# Patient Record
Sex: FEMALE | Race: WHITE | NOT HISPANIC OR LATINO | Employment: FULL TIME | RURAL
[De-identification: names, ages, dates, MRNs, and addresses within clinical notes are randomized per-mention and may not be internally consistent; named-entity substitution may affect disease eponyms.]

---

## 2023-05-10 ENCOUNTER — OFFICE VISIT (OUTPATIENT)
Dept: FAMILY MEDICINE CLINIC | Facility: CLINIC | Age: 38
End: 2023-05-10
Payer: COMMERCIAL

## 2023-05-10 VITALS
HEART RATE: 84 BPM | OXYGEN SATURATION: 97 % | SYSTOLIC BLOOD PRESSURE: 128 MMHG | DIASTOLIC BLOOD PRESSURE: 80 MMHG | HEIGHT: 64 IN | BODY MASS INDEX: 21.17 KG/M2 | WEIGHT: 124 LBS

## 2023-05-10 DIAGNOSIS — Z13.220 LIPID SCREENING: ICD-10-CM

## 2023-05-10 DIAGNOSIS — N92.6 IRREGULAR MENSES: Primary | ICD-10-CM

## 2023-05-10 LAB
B-HCG UR QL: NEGATIVE
EXPIRATION DATE: ABNORMAL
INTERNAL NEGATIVE CONTROL: ABNORMAL
INTERNAL POSITIVE CONTROL: ABNORMAL
Lab: ABNORMAL

## 2023-05-10 PROCEDURE — 81025 URINE PREGNANCY TEST: CPT | Performed by: PHYSICIAN ASSISTANT

## 2023-05-10 PROCEDURE — 99203 OFFICE O/P NEW LOW 30 MIN: CPT | Performed by: PHYSICIAN ASSISTANT

## 2023-05-10 NOTE — PROGRESS NOTES
"Chief Complaint  Establish Care (Needs a PCP pt states she has been having two periods in a month for couple months )    Subjective          Carina MCCOLLUM presents to [facility]  History of Present Illness  Patient in today to establish care and to discuss irregular periods.  has had 2 periods per months for past 4+ months. Currently on period- states started 2 days ago. These have been associated with cramping as well. States prior to this time they were pretty regular. \[facility] [place] [facility]\"" has not had labs checked in several years and would like to get cholesterol checked while here . \[facility] [place] [facility]\"" last pap smear in 2012 after having tubal ligation. Denies risk for STD. Denies any vaginal discharge or pelvic pain.       Objective   Vital Signs:   /80 (BP Location: Left arm, Patient Position: Sitting, Cuff Size: Large Adult)   Pulse 84   Ht 162.6 cm (64\")   Wt 56.2 kg (124 lb)   SpO2 97%   BMI 21.28 kg/m²     Body mass index is 21.28 kg/m².    Review of Systems   Constitutional: Negative for fever.   Respiratory: Negative for cough and shortness of breath.    Cardiovascular: Negative for chest pain.   Gastrointestinal: Negative for diarrhea, nausea and vomiting.   Genitourinary: Negative for dysuria and frequency.   Neurological: Negative for dizziness and headache.       Past History:  Medical History: has no past medical history on file.   Surgical History: has a past surgical history that includes Essure tubal ligation (Bilateral, 2012).   Family History: family history includes Breast cancer in her mother; COPD in her mother; Lung cancer in her maternal aunt; No Known Problems in her brother and father.   Social History: reports that she has been smoking cigarettes. She started smoking about 23 years ago. She has been smoking an average of .5 packs per day. She does not have any smokeless tobacco history on file. She reports current alcohol use. She reports that she does not use " drugs.    No current outpatient medications on file.  Allergies: Patient has no known allergies.    Physical Exam  Constitutional:       Appearance: Normal appearance.   HENT:      Right Ear: Tympanic membrane normal.      Left Ear: Tympanic membrane normal.      Mouth/Throat:      Mouth: Mucous membranes are moist.   Eyes:      Pupils: Pupils are equal, round, and reactive to light.   Cardiovascular:      Rate and Rhythm: Normal rate and regular rhythm.      Heart sounds: Normal heart sounds.   Pulmonary:      Effort: Pulmonary effort is normal.      Breath sounds: Normal breath sounds.   Abdominal:      Palpations: Abdomen is soft.      Tenderness: There is no abdominal tenderness. There is no guarding or rebound.   Neurological:      Mental Status: She is alert and oriented to person, place, and time.   Psychiatric:         Mood and Affect: Mood normal.         Behavior: Behavior normal.             Assessment and Plan   Diagnoses and all orders for this visit:    1. Irregular menses (Primary)  -     POC Pregnancy, Urine  -     CBC & Differential; Future  -     Comprehensive Metabolic Panel; Future  -     TSH; Future  Urine hcg negative . Will check labs today and will refer to gyn for further evaluation on irregular cycles.   2. Lipid screening  -     Lipid Panel; Future  Will check fasting lipid panel today- encourage healthy diet and exercise.           Follow Up   No follow-ups on file.  Patient was given instructions and counseling regarding her condition or for health maintenance advice. Please see specific information pulled into the AVS if appropriate.     Ambar Ornelas PA-C

## 2023-05-11 DIAGNOSIS — N92.6 IRREGULAR MENSES: Primary | ICD-10-CM

## 2023-05-11 LAB
ALBUMIN SERPL-MCNC: 4.3 G/DL (ref 3.8–4.8)
ALBUMIN/GLOB SERPL: 1.7 {RATIO} (ref 1.2–2.2)
ALP SERPL-CCNC: 86 IU/L (ref 44–121)
ALT SERPL-CCNC: 9 IU/L (ref 0–32)
AST SERPL-CCNC: 11 IU/L (ref 0–40)
BASOPHILS # BLD AUTO: 0.1 X10E3/UL (ref 0–0.2)
BASOPHILS NFR BLD AUTO: 1 %
BILIRUB SERPL-MCNC: 0.2 MG/DL (ref 0–1.2)
BUN SERPL-MCNC: 7 MG/DL (ref 6–20)
BUN/CREAT SERPL: 12 (ref 9–23)
CALCIUM SERPL-MCNC: 9.2 MG/DL (ref 8.7–10.2)
CHLORIDE SERPL-SCNC: 103 MMOL/L (ref 96–106)
CHOLEST SERPL-MCNC: 147 MG/DL (ref 100–199)
CO2 SERPL-SCNC: 25 MMOL/L (ref 20–29)
CREAT SERPL-MCNC: 0.6 MG/DL (ref 0.57–1)
EGFRCR SERPLBLD CKD-EPI 2021: 117 ML/MIN/1.73
EOSINOPHIL # BLD AUTO: 0.1 X10E3/UL (ref 0–0.4)
EOSINOPHIL NFR BLD AUTO: 2 %
ERYTHROCYTE [DISTWIDTH] IN BLOOD BY AUTOMATED COUNT: 17.2 % (ref 11.7–15.4)
GLOBULIN SER CALC-MCNC: 2.5 G/DL (ref 1.5–4.5)
GLUCOSE SERPL-MCNC: 60 MG/DL (ref 70–99)
HCT VFR BLD AUTO: 34.7 % (ref 34–46.6)
HDLC SERPL-MCNC: 27 MG/DL
HGB BLD-MCNC: 10.9 G/DL (ref 11.1–15.9)
IMM GRANULOCYTES # BLD AUTO: 0 X10E3/UL (ref 0–0.1)
IMM GRANULOCYTES NFR BLD AUTO: 0 %
LDLC SERPL CALC-MCNC: 90 MG/DL (ref 0–99)
LYMPHOCYTES # BLD AUTO: 2.3 X10E3/UL (ref 0.7–3.1)
LYMPHOCYTES NFR BLD AUTO: 31 %
MCH RBC QN AUTO: 24.8 PG (ref 26.6–33)
MCHC RBC AUTO-ENTMCNC: 31.4 G/DL (ref 31.5–35.7)
MCV RBC AUTO: 79 FL (ref 79–97)
MONOCYTES # BLD AUTO: 0.4 X10E3/UL (ref 0.1–0.9)
MONOCYTES NFR BLD AUTO: 6 %
NEUTROPHILS # BLD AUTO: 4.5 X10E3/UL (ref 1.4–7)
NEUTROPHILS NFR BLD AUTO: 60 %
PLATELET # BLD AUTO: 430 X10E3/UL (ref 150–450)
POTASSIUM SERPL-SCNC: 4.3 MMOL/L (ref 3.5–5.2)
PROT SERPL-MCNC: 6.8 G/DL (ref 6–8.5)
RBC # BLD AUTO: 4.39 X10E6/UL (ref 3.77–5.28)
SODIUM SERPL-SCNC: 141 MMOL/L (ref 134–144)
TRIGL SERPL-MCNC: 172 MG/DL (ref 0–149)
TSH SERPL DL<=0.005 MIU/L-ACNC: 0.82 UIU/ML (ref 0.45–4.5)
VLDLC SERPL CALC-MCNC: 30 MG/DL (ref 5–40)
WBC # BLD AUTO: 7.5 X10E3/UL (ref 3.4–10.8)

## 2023-08-22 ENCOUNTER — OFFICE VISIT (OUTPATIENT)
Dept: FAMILY MEDICINE CLINIC | Facility: CLINIC | Age: 38
End: 2023-08-22
Payer: COMMERCIAL

## 2023-08-22 VITALS
DIASTOLIC BLOOD PRESSURE: 78 MMHG | SYSTOLIC BLOOD PRESSURE: 124 MMHG | TEMPERATURE: 98.7 F | HEIGHT: 64 IN | HEART RATE: 90 BPM | BODY MASS INDEX: 20.49 KG/M2 | WEIGHT: 120 LBS | OXYGEN SATURATION: 98 %

## 2023-08-22 DIAGNOSIS — R10.11 RUQ PAIN: Primary | ICD-10-CM

## 2023-08-22 LAB
B-HCG UR QL: NEGATIVE
BILIRUB BLD-MCNC: NEGATIVE MG/DL
CLARITY, POC: CLEAR
COLOR UR: YELLOW
EXPIRATION DATE: ABNORMAL
EXPIRATION DATE: NORMAL
GLUCOSE UR STRIP-MCNC: NEGATIVE MG/DL
INTERNAL NEGATIVE CONTROL: NORMAL
INTERNAL POSITIVE CONTROL: NORMAL
KETONES UR QL: NEGATIVE
LEUKOCYTE EST, POC: NEGATIVE
Lab: ABNORMAL
Lab: NORMAL
NITRITE UR-MCNC: NEGATIVE MG/ML
PH UR: 6.5 [PH] (ref 5–8)
PROT UR STRIP-MCNC: NEGATIVE MG/DL
RBC # UR STRIP: ABNORMAL /UL
SP GR UR: 1.01 (ref 1–1.03)
UROBILINOGEN UR QL: NORMAL

## 2023-08-22 PROCEDURE — 81025 URINE PREGNANCY TEST: CPT | Performed by: PHYSICIAN ASSISTANT

## 2023-08-22 PROCEDURE — 81003 URINALYSIS AUTO W/O SCOPE: CPT | Performed by: PHYSICIAN ASSISTANT

## 2023-08-22 PROCEDURE — 99213 OFFICE O/P EST LOW 20 MIN: CPT | Performed by: PHYSICIAN ASSISTANT

## 2023-08-22 RX ORDER — ONDANSETRON 4 MG/1
TABLET, FILM COATED ORAL
Qty: 8 TABLET | Refills: 0 | Status: SHIPPED | OUTPATIENT
Start: 2023-08-22

## 2023-08-23 DIAGNOSIS — R10.11 RUQ PAIN: Primary | ICD-10-CM

## 2023-08-23 LAB
ALBUMIN SERPL-MCNC: 4.6 G/DL (ref 3.9–4.9)
ALBUMIN/GLOB SERPL: 2.1 {RATIO} (ref 1.2–2.2)
ALP SERPL-CCNC: 93 IU/L (ref 44–121)
ALT SERPL-CCNC: 13 IU/L (ref 0–32)
AMYLASE SERPL-CCNC: 64 U/L (ref 31–110)
AST SERPL-CCNC: 13 IU/L (ref 0–40)
BASOPHILS # BLD AUTO: 0.1 X10E3/UL (ref 0–0.2)
BASOPHILS NFR BLD AUTO: 1 %
BILIRUB SERPL-MCNC: 0.4 MG/DL (ref 0–1.2)
BUN SERPL-MCNC: 5 MG/DL (ref 6–20)
BUN/CREAT SERPL: 8 (ref 9–23)
CALCIUM SERPL-MCNC: 9.4 MG/DL (ref 8.7–10.2)
CHLORIDE SERPL-SCNC: 100 MMOL/L (ref 96–106)
CO2 SERPL-SCNC: 22 MMOL/L (ref 20–29)
CREAT SERPL-MCNC: 0.65 MG/DL (ref 0.57–1)
EGFRCR SERPLBLD CKD-EPI 2021: 115 ML/MIN/1.73
EOSINOPHIL # BLD AUTO: 0.1 X10E3/UL (ref 0–0.4)
EOSINOPHIL NFR BLD AUTO: 1 %
ERYTHROCYTE [DISTWIDTH] IN BLOOD BY AUTOMATED COUNT: 15.9 % (ref 11.7–15.4)
GLOBULIN SER CALC-MCNC: 2.2 G/DL (ref 1.5–4.5)
GLUCOSE SERPL-MCNC: 105 MG/DL (ref 70–99)
HCT VFR BLD AUTO: 42.1 % (ref 34–46.6)
HGB BLD-MCNC: 13.4 G/DL (ref 11.1–15.9)
IMM GRANULOCYTES # BLD AUTO: 0 X10E3/UL (ref 0–0.1)
IMM GRANULOCYTES NFR BLD AUTO: 0 %
LIPASE SERPL-CCNC: 42 U/L (ref 14–72)
LYMPHOCYTES # BLD AUTO: 2.4 X10E3/UL (ref 0.7–3.1)
LYMPHOCYTES NFR BLD AUTO: 27 %
MCH RBC QN AUTO: 26.9 PG (ref 26.6–33)
MCHC RBC AUTO-ENTMCNC: 31.8 G/DL (ref 31.5–35.7)
MCV RBC AUTO: 84 FL (ref 79–97)
MONOCYTES # BLD AUTO: 0.5 X10E3/UL (ref 0.1–0.9)
MONOCYTES NFR BLD AUTO: 6 %
NEUTROPHILS # BLD AUTO: 5.8 X10E3/UL (ref 1.4–7)
NEUTROPHILS NFR BLD AUTO: 65 %
PLATELET # BLD AUTO: 392 X10E3/UL (ref 150–450)
POTASSIUM SERPL-SCNC: 4.1 MMOL/L (ref 3.5–5.2)
PROT SERPL-MCNC: 6.8 G/DL (ref 6–8.5)
RBC # BLD AUTO: 4.99 X10E6/UL (ref 3.77–5.28)
SODIUM SERPL-SCNC: 138 MMOL/L (ref 134–144)
WBC # BLD AUTO: 8.8 X10E3/UL (ref 3.4–10.8)

## 2023-08-24 DIAGNOSIS — R31.9 HEMATURIA, UNSPECIFIED TYPE: Primary | ICD-10-CM

## 2023-08-24 LAB
BACTERIA UR CULT: NORMAL
BACTERIA UR CULT: NORMAL

## 2024-07-09 ENCOUNTER — TELEPHONE (OUTPATIENT)
Dept: FAMILY MEDICINE CLINIC | Facility: CLINIC | Age: 39
End: 2024-07-09

## 2024-07-09 NOTE — TELEPHONE ENCOUNTER
Caller: Carina MCCOLLUM    Relationship: Self    Best call back number: [phone]    What medication are you requesting: SOMETHING FOR MENOPAUSE, MOOD SWINGS, GO BACK AND FORTH WITH WANTING SEX, HAVING HEAVY BLEEDING DURING PERIODS    What are your current symptoms: NIGHT SWEATS     How long have you been experiencing symptoms: 6  MONTHS    Have you had these symptoms before:    [] Yes  [x] No    Have you been treated for these symptoms before:   [] Yes  [x] No    If a prescription is needed, what is your preferred pharmacy and phone number:  [facility] [address] [place] [address] [phone] [facility] [phone] FX     Additional notes:

## 2024-07-09 NOTE — TELEPHONE ENCOUNTER
Called and spoke with patient. I gave her the number for [facility]. [phone]

## 2025-07-01 ENCOUNTER — APPOINTMENT (OUTPATIENT)
Dept: CT IMAGING | Facility: HOSPITAL | Age: 40
End: 2025-07-01
Payer: COMMERCIAL

## 2025-07-01 ENCOUNTER — APPOINTMENT (OUTPATIENT)
Dept: GENERAL RADIOLOGY | Facility: HOSPITAL | Age: 40
End: 2025-07-01
Payer: COMMERCIAL

## 2025-07-01 ENCOUNTER — APPOINTMENT (OUTPATIENT)
Dept: MRI IMAGING | Facility: HOSPITAL | Age: 40
End: 2025-07-01
Payer: COMMERCIAL

## 2025-07-01 ENCOUNTER — APPOINTMENT (OUTPATIENT)
Dept: NEUROLOGY | Facility: HOSPITAL | Age: 40
End: 2025-07-01
Payer: COMMERCIAL

## 2025-07-01 ENCOUNTER — HOSPITAL ENCOUNTER (OUTPATIENT)
Facility: HOSPITAL | Age: 40
Setting detail: OBSERVATION
Discharge: HOME | End: 2025-07-02
Attending: EMERGENCY MEDICINE | Admitting: INTERNAL MEDICINE
Payer: COMMERCIAL

## 2025-07-01 ENCOUNTER — TELEPHONE (OUTPATIENT)
Dept: FAMILY MEDICINE CLINIC | Facility: CLINIC | Age: 40
End: 2025-07-01
Payer: COMMERCIAL

## 2025-07-01 DIAGNOSIS — I10 HYPERTENSION, UNSPECIFIED TYPE: ICD-10-CM

## 2025-07-01 DIAGNOSIS — R20.0 LEFT SIDED NUMBNESS: Primary | ICD-10-CM

## 2025-07-01 DIAGNOSIS — R51.9 RECURRENT HEADACHE: ICD-10-CM

## 2025-07-01 PROBLEM — R53.1 LEFT-SIDED WEAKNESS: Status: ACTIVE | Noted: 2025-07-01

## 2025-07-01 LAB
ALBUMIN SERPL-MCNC: 4.5 G/DL (ref 3.5–5.2)
ALBUMIN/GLOB SERPL: 1.6 G/DL
ALP SERPL-CCNC: 87 U/L (ref 39–117)
ALT SERPL W P-5'-P-CCNC: 8 U/L (ref 1–33)
AMPHET+METHAMPHET UR QL: NEGATIVE
AMPHETAMINES UR QL: NEGATIVE
ANION GAP SERPL CALCULATED.3IONS-SCNC: 11.4 MMOL/L (ref 5–15)
APTT PPP: 27.4 SECONDS (ref 22–39)
AST SERPL-CCNC: 20 U/L (ref 1–32)
B-HCG UR QL: NEGATIVE
BARBITURATES UR QL SCN: NEGATIVE
BASOPHILS # BLD AUTO: 0.06 10*3/MM3 (ref 0–0.2)
BASOPHILS NFR BLD AUTO: 0.6 % (ref 0–1.5)
BENZODIAZ UR QL SCN: NEGATIVE
BILIRUB SERPL-MCNC: 0.4 MG/DL (ref 0–1.2)
BUN BLDA-MCNC: <3 MG/DL (ref 8–26)
BUN SERPL-MCNC: 3.8 MG/DL (ref 6–20)
BUN/CREAT SERPL: 6.8 (ref 7–25)
BUPRENORPHINE SERPL-MCNC: NEGATIVE NG/ML
CA-I BLDA-SCNC: 1.19 MMOL/L (ref 1.2–1.32)
CALCIUM SPEC-SCNC: 9 MG/DL (ref 8.6–10.5)
CANNABINOIDS SERPL QL: POSITIVE
CHLORIDE BLDA-SCNC: 104 MMOL/L (ref 98–109)
CHLORIDE SERPL-SCNC: 105 MMOL/L (ref 98–107)
CO2 BLDA-SCNC: 21 MMOL/L (ref 24–29)
CO2 SERPL-SCNC: 22.6 MMOL/L (ref 22–29)
COCAINE UR QL: NEGATIVE
CREAT BLDA-MCNC: 0.5 MG/DL (ref 0.6–1.3)
CREAT SERPL-MCNC: 0.56 MG/DL (ref 0.57–1)
DEPRECATED RDW RBC AUTO: 53.4 FL (ref 37–54)
EGFRCR SERPLBLD CKD-EPI 2021: 117.8 ML/MIN/1.73
EGFRCR SERPLBLD CKD-EPI 2021: 121 ML/MIN/1.73
EOSINOPHIL # BLD AUTO: 0.06 10*3/MM3 (ref 0–0.4)
EOSINOPHIL NFR BLD AUTO: 0.6 % (ref 0.3–6.2)
ERYTHROCYTE [DISTWIDTH] IN BLOOD BY AUTOMATED COUNT: 18.6 % (ref 12.3–15.4)
FENTANYL UR-MCNC: NEGATIVE NG/ML
GEN 5 1HR TROPONIN T REFLEX: 11 NG/L
GLOBULIN UR ELPH-MCNC: 2.9 GM/DL
GLUCOSE BLDC GLUCOMTR-MCNC: 104 MG/DL (ref 70–130)
GLUCOSE BLDC GLUCOMTR-MCNC: 105 MG/DL (ref 70–130)
GLUCOSE BLDC GLUCOMTR-MCNC: 111 MG/DL (ref 70–130)
GLUCOSE SERPL-MCNC: 111 MG/DL (ref 65–99)
HCT VFR BLD AUTO: 39.1 % (ref 34–46.6)
HCT VFR BLDA CALC: 42 % (ref 38–51)
HGB BLD-MCNC: 12.5 G/DL (ref 12–15.9)
HGB BLDA-MCNC: 14.3 G/DL (ref 12–17)
HOLD SPECIMEN: NORMAL
IMM GRANULOCYTES # BLD AUTO: 0.05 10*3/MM3 (ref 0–0.05)
IMM GRANULOCYTES NFR BLD AUTO: 0.5 % (ref 0–0.5)
INR PPP: 0.99 (ref 0.89–1.12)
LYMPHOCYTES # BLD AUTO: 2.25 10*3/MM3 (ref 0.7–3.1)
LYMPHOCYTES NFR BLD AUTO: 22 % (ref 19.6–45.3)
MCH RBC QN AUTO: 25.6 PG (ref 26.6–33)
MCHC RBC AUTO-ENTMCNC: 32 G/DL (ref 31.5–35.7)
MCV RBC AUTO: 80.1 FL (ref 79–97)
METHADONE UR QL SCN: NEGATIVE
MONOCYTES # BLD AUTO: 0.61 10*3/MM3 (ref 0.1–0.9)
MONOCYTES NFR BLD AUTO: 6 % (ref 5–12)
NEUTROPHILS NFR BLD AUTO: 7.18 10*3/MM3 (ref 1.7–7)
NEUTROPHILS NFR BLD AUTO: 70.3 % (ref 42.7–76)
NRBC BLD AUTO-RTO: 0 /100 WBC (ref 0–0.2)
NT-PROBNP SERPL-MCNC: 85.8 PG/ML (ref 0–450)
OPIATES UR QL: NEGATIVE
OXYCODONE UR QL SCN: NEGATIVE
PCP UR QL SCN: NEGATIVE
PLATELET # BLD AUTO: 397 10*3/MM3 (ref 140–450)
PMV BLD AUTO: 9.4 FL (ref 6–12)
POTASSIUM BLDA-SCNC: 3.6 MMOL/L (ref 3.5–4.9)
POTASSIUM SERPL-SCNC: 3.7 MMOL/L (ref 3.5–5.2)
PROT SERPL-MCNC: 7.4 G/DL (ref 6–8.5)
PROTHROMBIN TIME: 13.6 SECONDS (ref 12.2–15.3)
QT INTERVAL: 380 MS
QTC INTERVAL: 376 MS
RBC # BLD AUTO: 4.88 10*6/MM3 (ref 3.77–5.28)
SODIUM BLD-SCNC: 140 MMOL/L (ref 138–146)
SODIUM SERPL-SCNC: 139 MMOL/L (ref 136–145)
TRICYCLICS UR QL SCN: NEGATIVE
TROPONIN T NUMERIC DELTA: NORMAL
TROPONIN T SERPL HS-MCNC: <6 NG/L
WBC NRBC COR # BLD AUTO: 10.21 10*3/MM3 (ref 3.4–10.8)
WHOLE BLOOD HOLD COAG: NORMAL
WHOLE BLOOD HOLD SPECIMEN: NORMAL

## 2025-07-01 PROCEDURE — 95816 EEG AWAKE AND DROWSY: CPT

## 2025-07-01 PROCEDURE — G0378 HOSPITAL OBSERVATION PER HR: HCPCS

## 2025-07-01 PROCEDURE — 84484 ASSAY OF TROPONIN QUANT: CPT | Performed by: EMERGENCY MEDICINE

## 2025-07-01 PROCEDURE — 85610 PROTHROMBIN TIME: CPT | Performed by: EMERGENCY MEDICINE

## 2025-07-01 PROCEDURE — 81025 URINE PREGNANCY TEST: CPT | Performed by: EMERGENCY MEDICINE

## 2025-07-01 PROCEDURE — 85014 HEMATOCRIT: CPT | Performed by: EMERGENCY MEDICINE

## 2025-07-01 PROCEDURE — 80053 COMPREHEN METABOLIC PANEL: CPT | Performed by: EMERGENCY MEDICINE

## 2025-07-01 PROCEDURE — 82948 REAGENT STRIP/BLOOD GLUCOSE: CPT

## 2025-07-01 PROCEDURE — 70551 MRI BRAIN STEM W/O DYE: CPT

## 2025-07-01 PROCEDURE — 96375 TX/PRO/DX INJ NEW DRUG ADDON: CPT

## 2025-07-01 PROCEDURE — 25810000003 SODIUM CHLORIDE 0.9 % SOLUTION

## 2025-07-01 PROCEDURE — 80307 DRUG TEST PRSMV CHEM ANLYZR: CPT | Performed by: EMERGENCY MEDICINE

## 2025-07-01 PROCEDURE — 99222 1ST HOSP IP/OBS MODERATE 55: CPT | Performed by: INTERNAL MEDICINE

## 2025-07-01 PROCEDURE — 25010000002 METOCLOPRAMIDE PER 10 MG: Performed by: INTERNAL MEDICINE

## 2025-07-01 PROCEDURE — 99285 EMERGENCY DEPT VISIT HI MDM: CPT

## 2025-07-01 PROCEDURE — 80047 BASIC METABLC PNL IONIZED CA: CPT | Performed by: EMERGENCY MEDICINE

## 2025-07-01 PROCEDURE — 70450 CT HEAD/BRAIN W/O DYE: CPT

## 2025-07-01 PROCEDURE — 83880 ASSAY OF NATRIURETIC PEPTIDE: CPT | Performed by: EMERGENCY MEDICINE

## 2025-07-01 PROCEDURE — 36415 COLL VENOUS BLD VENIPUNCTURE: CPT

## 2025-07-01 PROCEDURE — 85025 COMPLETE CBC W/AUTO DIFF WBC: CPT | Performed by: EMERGENCY MEDICINE

## 2025-07-01 PROCEDURE — 0042T HC CT CEREBRAL PERFUSION W/WO CONTRAST: CPT

## 2025-07-01 PROCEDURE — 95816 EEG AWAKE AND DROWSY: CPT | Performed by: PSYCHIATRY & NEUROLOGY

## 2025-07-01 PROCEDURE — 70496 CT ANGIOGRAPHY HEAD: CPT

## 2025-07-01 PROCEDURE — 25510000001 IOPAMIDOL PER 1 ML: Performed by: EMERGENCY MEDICINE

## 2025-07-01 PROCEDURE — 85730 THROMBOPLASTIN TIME PARTIAL: CPT | Performed by: EMERGENCY MEDICINE

## 2025-07-01 PROCEDURE — 25010000002 MAGNESIUM SULFATE IN D5W 1G/100ML (PREMIX) 1-5 GM/100ML-% SOLUTION

## 2025-07-01 PROCEDURE — 25010000002 DIPHENHYDRAMINE PER 50 MG

## 2025-07-01 PROCEDURE — 96366 THER/PROPH/DIAG IV INF ADDON: CPT

## 2025-07-01 PROCEDURE — 71045 X-RAY EXAM CHEST 1 VIEW: CPT

## 2025-07-01 PROCEDURE — 96376 TX/PRO/DX INJ SAME DRUG ADON: CPT

## 2025-07-01 PROCEDURE — 25010000002 METOCLOPRAMIDE PER 10 MG

## 2025-07-01 PROCEDURE — 93005 ELECTROCARDIOGRAM TRACING: CPT | Performed by: EMERGENCY MEDICINE

## 2025-07-01 PROCEDURE — 70498 CT ANGIOGRAPHY NECK: CPT

## 2025-07-01 PROCEDURE — 96365 THER/PROPH/DIAG IV INF INIT: CPT

## 2025-07-01 RX ORDER — ACETAMINOPHEN 325 MG/1
650 TABLET ORAL EVERY 4 HOURS PRN
Status: DISCONTINUED | OUTPATIENT
Start: 2025-07-01 | End: 2025-07-02 | Stop reason: HOSPADM

## 2025-07-01 RX ORDER — ATORVASTATIN CALCIUM 40 MG/1
80 TABLET, FILM COATED ORAL NIGHTLY
Status: DISCONTINUED | OUTPATIENT
Start: 2025-07-01 | End: 2025-07-02 | Stop reason: HOSPADM

## 2025-07-01 RX ORDER — SODIUM CHLORIDE 0.9 % (FLUSH) 0.9 %
10 SYRINGE (ML) INJECTION AS NEEDED
Status: DISCONTINUED | OUTPATIENT
Start: 2025-07-01 | End: 2025-07-02 | Stop reason: HOSPADM

## 2025-07-01 RX ORDER — ACETAMINOPHEN 650 MG/1
650 SUPPOSITORY RECTAL EVERY 4 HOURS PRN
Status: DISCONTINUED | OUTPATIENT
Start: 2025-07-01 | End: 2025-07-02 | Stop reason: HOSPADM

## 2025-07-01 RX ORDER — SODIUM CHLORIDE 0.9 % (FLUSH) 0.9 %
10 SYRINGE (ML) INJECTION EVERY 12 HOURS SCHEDULED
Status: DISCONTINUED | OUTPATIENT
Start: 2025-07-01 | End: 2025-07-02 | Stop reason: HOSPADM

## 2025-07-01 RX ORDER — POTASSIUM CHLORIDE 1500 MG/1
40 TABLET, EXTENDED RELEASE ORAL EVERY 4 HOURS
Status: COMPLETED | OUTPATIENT
Start: 2025-07-01 | End: 2025-07-01

## 2025-07-01 RX ORDER — BISACODYL 5 MG/1
5 TABLET, DELAYED RELEASE ORAL DAILY PRN
Status: DISCONTINUED | OUTPATIENT
Start: 2025-07-01 | End: 2025-07-02 | Stop reason: HOSPADM

## 2025-07-01 RX ORDER — NICOTINE 21 MG/24HR
1 PATCH, TRANSDERMAL 24 HOURS TRANSDERMAL EVERY 24 HOURS
Status: DISCONTINUED | OUTPATIENT
Start: 2025-07-01 | End: 2025-07-02 | Stop reason: HOSPADM

## 2025-07-01 RX ORDER — ASPIRIN 300 MG/1
300 SUPPOSITORY RECTAL DAILY
Status: DISCONTINUED | OUTPATIENT
Start: 2025-07-02 | End: 2025-07-02 | Stop reason: HOSPADM

## 2025-07-01 RX ORDER — CLOPIDOGREL BISULFATE 75 MG/1
300 TABLET ORAL ONCE
Status: COMPLETED | OUTPATIENT
Start: 2025-07-01 | End: 2025-07-01

## 2025-07-01 RX ORDER — SODIUM CHLORIDE 9 MG/ML
40 INJECTION, SOLUTION INTRAVENOUS AS NEEDED
Status: DISCONTINUED | OUTPATIENT
Start: 2025-07-01 | End: 2025-07-02 | Stop reason: HOSPADM

## 2025-07-01 RX ORDER — ONDANSETRON 2 MG/ML
4 INJECTION INTRAMUSCULAR; INTRAVENOUS EVERY 6 HOURS PRN
Status: DISCONTINUED | OUTPATIENT
Start: 2025-07-01 | End: 2025-07-02 | Stop reason: HOSPADM

## 2025-07-01 RX ORDER — ACETAMINOPHEN 160 MG/5ML
650 SOLUTION ORAL EVERY 4 HOURS PRN
Status: DISCONTINUED | OUTPATIENT
Start: 2025-07-01 | End: 2025-07-02 | Stop reason: HOSPADM

## 2025-07-01 RX ORDER — CLOPIDOGREL BISULFATE 75 MG/1
75 TABLET ORAL DAILY
Status: DISCONTINUED | OUTPATIENT
Start: 2025-07-02 | End: 2025-07-02 | Stop reason: HOSPADM

## 2025-07-01 RX ORDER — ASPIRIN 81 MG/1
81 TABLET, CHEWABLE ORAL DAILY
Status: DISCONTINUED | OUTPATIENT
Start: 2025-07-02 | End: 2025-07-02 | Stop reason: HOSPADM

## 2025-07-01 RX ORDER — IOPAMIDOL 755 MG/ML
100 INJECTION, SOLUTION INTRAVASCULAR
Status: COMPLETED | OUTPATIENT
Start: 2025-07-01 | End: 2025-07-01

## 2025-07-01 RX ORDER — AMOXICILLIN 250 MG
2 CAPSULE ORAL 2 TIMES DAILY PRN
Status: DISCONTINUED | OUTPATIENT
Start: 2025-07-01 | End: 2025-07-02 | Stop reason: HOSPADM

## 2025-07-01 RX ORDER — MAGNESIUM SULFATE 1 G/100ML
1 INJECTION INTRAVENOUS ONCE
Status: COMPLETED | OUTPATIENT
Start: 2025-07-01 | End: 2025-07-01

## 2025-07-01 RX ORDER — METOCLOPRAMIDE HYDROCHLORIDE 5 MG/ML
10 INJECTION INTRAMUSCULAR; INTRAVENOUS EVERY 6 HOURS SCHEDULED
Status: DISCONTINUED | OUTPATIENT
Start: 2025-07-01 | End: 2025-07-02 | Stop reason: HOSPADM

## 2025-07-01 RX ORDER — BISACODYL 10 MG
10 SUPPOSITORY, RECTAL RECTAL DAILY PRN
Status: DISCONTINUED | OUTPATIENT
Start: 2025-07-01 | End: 2025-07-02 | Stop reason: HOSPADM

## 2025-07-01 RX ORDER — POLYETHYLENE GLYCOL 3350 17 G/17G
17 POWDER, FOR SOLUTION ORAL DAILY PRN
Status: DISCONTINUED | OUTPATIENT
Start: 2025-07-01 | End: 2025-07-02 | Stop reason: HOSPADM

## 2025-07-01 RX ORDER — ASPIRIN 81 MG/1
324 TABLET, CHEWABLE ORAL ONCE
Status: COMPLETED | OUTPATIENT
Start: 2025-07-01 | End: 2025-07-01

## 2025-07-01 RX ORDER — DIPHENHYDRAMINE HYDROCHLORIDE 50 MG/ML
25 INJECTION, SOLUTION INTRAMUSCULAR; INTRAVENOUS ONCE
Status: COMPLETED | OUTPATIENT
Start: 2025-07-01 | End: 2025-07-01

## 2025-07-01 RX ADMIN — Medication 10 ML: at 20:08

## 2025-07-01 RX ADMIN — POTASSIUM CHLORIDE 40 MEQ: 1500 TABLET, EXTENDED RELEASE ORAL at 18:27

## 2025-07-01 RX ADMIN — Medication 10 ML: at 20:09

## 2025-07-01 RX ADMIN — METOCLOPRAMIDE 10 MG: 5 INJECTION, SOLUTION INTRAMUSCULAR; INTRAVENOUS at 20:08

## 2025-07-01 RX ADMIN — CLOPIDOGREL BISULFATE 300 MG: 75 TABLET, FILM COATED ORAL at 14:36

## 2025-07-01 RX ADMIN — METOCLOPRAMIDE 10 MG: 5 INJECTION, SOLUTION INTRAMUSCULAR; INTRAVENOUS at 14:36

## 2025-07-01 RX ADMIN — METOCLOPRAMIDE 10 MG: 5 INJECTION, SOLUTION INTRAMUSCULAR; INTRAVENOUS at 23:26

## 2025-07-01 RX ADMIN — SODIUM CHLORIDE 1000 ML: 9 INJECTION, SOLUTION INTRAVENOUS at 14:37

## 2025-07-01 RX ADMIN — MAGNESIUM SULFATE IN DEXTROSE 1 G: 10 INJECTION, SOLUTION INTRAVENOUS at 14:37

## 2025-07-01 RX ADMIN — ATORVASTATIN CALCIUM 80 MG: 40 TABLET, FILM COATED ORAL at 20:08

## 2025-07-01 RX ADMIN — POTASSIUM CHLORIDE 40 MEQ: 1500 TABLET, EXTENDED RELEASE ORAL at 23:26

## 2025-07-01 RX ADMIN — ASPIRIN 324 MG: 81 TABLET, CHEWABLE ORAL at 14:36

## 2025-07-01 RX ADMIN — IOPAMIDOL 125 ML: 755 INJECTION, SOLUTION INTRAVENOUS at 13:19

## 2025-07-01 RX ADMIN — NICOTINE TRANSDERMAL SYSTEM 1 PATCH: 21 PATCH, EXTENDED RELEASE TRANSDERMAL at 18:28

## 2025-07-01 RX ADMIN — ACETAMINOPHEN 650 MG: 325 TABLET ORAL at 20:08

## 2025-07-01 RX ADMIN — DIPHENHYDRAMINE HYDROCHLORIDE 25 MG: 50 INJECTION INTRAMUSCULAR; INTRAVENOUS at 14:36

## 2025-07-01 NOTE — Clinical Note
Level of Care: Telemetry [5]   Diagnosis: Left-sided weakness [163961]   Bed Request Comments: cva r/o

## 2025-07-01 NOTE — H&P
"    [facility] Services  HISTORY AND PHYSICAL    Patient Name: Carina MCCOLLUM  : 1985  MRN: [id number]  Primary Care Physician: Ambar Ornelas PA-C  Date of admission: 2025      Subjective   Subjective     Chief Complaint:  Left sided weakness, numbness    HPI:  Carina MCCOLLUM is a 40 y.o. female with PMHx significant for tobacco abuse, migraines who presented to [facility] as a code stroke for left sided weakness/numbness. Patient reports her symptoms started this a couple days ago and have been waxing and waning. Symptoms include left arm/leg weakness, numbness and tremors. She has no known PMHX of stroke or seizures. She reportedly has been witnessed to have \"epidsodes\" that involve rythmic jerking of both her upper extremities and sometimes her legs. She reports they just \"give out\". She previously has been evaluated at [facility] ED for these episodes and was diagnosed with syncope. She denies any other focal deficits. She was quite hypertensive in the ED, however takes no medications for blood pressure and says she was not aware of HTN. In the ER her MRI was negative for stroke, however CTA concerning for possible carotid dissection.  She will be brought in for further work-up and neurology evaluation.    Personal History     History reviewed. No pertinent past medical history.        Past Surgical History:   Procedure Laterality Date    ESSURE TUBAL LIGATION Bilateral        Family History: family history includes Breast cancer in her mother; COPD in her mother; Lung cancer in her maternal aunt; No Known Problems in her brother and father.     Social History:  reports that she has been smoking cigarettes. She started smoking about 25 years ago. She has a 12.7 pack-year smoking history. She does not have any smokeless tobacco history on file. She reports current alcohol use. She reports that she does not use drugs.  Social History     Social History Narrative    Not " on file       Medications:  Available home medication information reviewed.  ondansetron    No Known Allergies    Objective   Objective     Vital Signs:   Temp:  [98.9 °F (37.2 °C)] 98.9 °F (37.2 °C)  Heart Rate:  [65-82] 81  Resp:  [18] 18  BP: (163-196)/() 191/124  Total (NIH Stroke Scale): 0    Physical Exam   Constitutional: Awake, alert  Eyes: PERRLA, sclerae anicteric, no conjunctival injection  HENT: NCAT, mucous membranes moist  Neck: Supple, no thyromegaly, no lymphadenopathy, trachea midline  Respiratory: Clear to auscultation bilaterally, nonlabored respirations   Cardiovascular: RRR, no murmurs, rubs, or gallops, palpable pedal pulses bilaterally  Gastrointestinal: Positive bowel sounds, soft, nontender, nondistended  Musculoskeletal: No bilateral ankle edema, no clubbing or cyanosis to extremities  Psychiatric: Appropriate affect, cooperative  Neurologic: Oriented x 3, strength symmetric in all extremities, Cranial Nerves grossly intact to confrontation, speech clear  Skin: No rashes      Result Review:  I have personally reviewed the results from the time of this admission to 7/1/2025 14:42 EDT and agree with these findings:  [x]  Laboratory list / accordion  [x]  Microbiology  [x]  Radiology  [x]  EKG/Telemetry   [x]  Cardiology/Vascular   []  Pathology  []  Old records  []  Other:  Most notable findings include:       LAB RESULTS:      Lab 07/01/25  1310 07/01/25  1309   WBC  --  10.21   HEMOGLOBIN  --  12.5   HEMOGLOBIN, POC 14.3  --    HEMATOCRIT  --  39.1   HEMATOCRIT POC 42  --    PLATELETS  --  397   NEUTROS ABS  --  7.18*   IMMATURE GRANS (ABS)  --  0.05   LYMPHS ABS  --  2.25   MONOS ABS  --  0.61   EOS ABS  --  0.06   MCV  --  80.1   PROTIME  --  13.6   INR  --  0.99   APTT  --  27.4         Lab 07/01/25  1310 07/01/25  1309   SODIUM  --  139   POTASSIUM  --  3.7   CHLORIDE  --  105   CO2  --  22.6   ANION GAP  --  11.4   BUN  --  3.8*   CREATININE 0.50* 0.56*   EGFR 121.0 117.8    GLUCOSE  --  111*   CALCIUM  --  9.0         Lab 07/01/25  1309   TOTAL PROTEIN 7.4   ALBUMIN 4.5   GLOBULIN 2.9   ALT (SGPT) 8   AST (SGOT) 20   BILIRUBIN 0.4   ALK PHOS 87         Lab 07/01/25  1309   PROBNP 85.8   HSTROP T <6                     Microbiology Results (last 10 days)       ** No results found for the last 240 hours. **            MRI Brain Without Contrast  Result Date: 7/1/2025  MRI BRAIN WO CONTRAST Date of Exam: 7/1/2025 1:48 PM EDT Indication: left sided weakness/ numbness.  Comparison: CT head from earlier today Technique:  Routine multiplanar/multisequence sequence images of the brain were obtained without contrast administration. Findings: No acute infarction, intracranial hemorrhage, or extra-axial collection is identified. The ventricles appear normal in caliber, with no evidence of mass effect or midline shift. The basal cisterns appear patent. The midline structures appear intact. The globes and orbits appear intact. The intracranial vascular flow-voids appear patent. A couple subcortical white matter FLAIR hyperintensities in the right frontal lobe are nonspecific. Both hippocampi appear within normal limits. There is mucosal disease in the right maxillary sinus.     Impression: Impression: 1.No acute intracranial process identified. 2.Mucosal disease within right maxillary sinus. Electronically Signed: Sanjay Childs MD  7/1/2025 2:21 PM EDT  Workstation ID: [id number]    CT Angiogram Head w AI Analysis of LVO  Result Date: 7/1/2025  CT ANGIOGRAM HEAD W AI ANALYSIS OF LVO Date of Exam: 7/1/2025 1:05 PM EDT Indication: Neuro Deficit, acute, Stroke suspected Neuro deficit, acute stroke suspected. Comparison: Noncontrast head CT and CT perfusion from today Technique: CTA of the head was performed after the uneventful intravenous administration of 125 mL Isovue-370. Reconstructed coronal and sagittal images were also obtained. In addition, a 3-D volume rendered image was created for  interpretation. Automated exposure control and iterative reconstruction methods were used. FINDINGS: The visualized intracranial portions of the internal carotid arteries as well as the middle cerebral, anterior cerebral, posterior cerebral, basilar, and vertebral arteries appear patent without significant stenosis. No intracranial aneurysm is seen. No suspicious contrast enhancement is identified. Please refer to the accompanying head CT for description of nonvascular intracranial findings.     Impression: No intracranial large vessel occlusion is identified. Electronically Signed: Yair Aldana MD  7/1/2025 1:58 PM EDT  Workstation ID: [id number]    CT Angiogram Neck  Result Date: 7/1/2025  CT ANGIOGRAM NECK Date of Exam: 7/1/2025 1:05 PM EDT Indication: Neuro Deficit, acute, Stroke suspected Neuro deficit, acute stroke suspected. Comparison: None available. Technique: CTA of the neck was performed before and after the uneventful intravenous administration of 80 mL Isovue-370. Reconstructed coronal and sagittal images were also obtained. In addition, a 3-D volume rendered image was created for interpretation. Automated exposure control and iterative reconstruction methods were used. Findings: The lung apices are clear. The neck soft tissues demonstrate no pathologic cervical lymphadenopathy or aerodigestive tract mass. The osseous structures demonstrate no evidence of acute fracture or aggressive osseous lesion. Patent aortic arch with three-vessel branching. The subclavian arteries are patent bilaterally. There is 0% narrowing of the right and left ICA origins by NASCET criteria. There is a tiny focal linear luminal abnormality present at the left ICA origin, along the posterior wall,  favoring incidental carotid web but at least somewhat concerning for possible small dissection without evidence of associated luminal narrowing or thrombus formation. The vertebral arteries are normal in course and caliber  bilaterally in the neck.     Impression: Impression: CT angiogram of the neck demonstrates no evidence of high-grade stenosis, large vessel occlusion or aneurysm. Small focal linear luminal abnormality seen along the posterior wall of the left ICA origin, favoring a carotid web, with short segment dissection an additional consideration. There is no evidence of associated thrombus formation or luminal narrowing. Electronically Signed: Dajuan Robles MD  7/1/2025 1:37 PM EDT  Workstation ID: [id number]    CT CEREBRAL PERFUSION WITH & WITHOUT CONTRAST  Result Date: 7/1/2025  CT CEREBRAL PERFUSION W WO CONTRAST Date of Exam: 7/1/2025 1:05 PM EDT Indication: Neuro Deficit, acute, Stroke suspected Neuro deficit, acute stroke suspected.  Comparison: Same day CT head Technique: Axial CT images of the brain were obtained prior to and after the administration of 125 mL Isovue-370. Core blood volume, core blood flow, mean transit time, and Tmax images were obtained utilizing the Rapid software protocol. A limited CT angiogram of the head was also performed to measure the blood vessel density. The radiation dose reduction device was turned on for each scan per the ALARA (As Low as Reasonably Achievable) protocol. Findings: There appears to be right greater than left anterior circulation diminished CBF suggestive of infarct (volume 41 mL). There is a large area of right greater than left elevated Tmax (volume 81 mL). The mismatch volume is 40 mL with mismatch ratio of 2. When compared to same day CTA, this does not appear to correlate findings on preliminary review of the CTA and these findings may be secondary to suboptimal bolus timing..     Impression: Impression: 1.Right greater than left anterior circulation diminished CBF suggestive of infarct. 2.There is a large area of right greater than left elevated Tmax (volume 81 mL) with mismatch volume 40 mL. 3.When compared to same day CTA, this does not appear to correlate to  findings on preliminary review of the CTA and these findings may be secondary to suboptimal bolus timing. Findings discussed with stroke team by Dr. Kenneth Anne via telephone on 7/1/2025 1:32 PM EDT. Electronically Signed: Kenneth Anne MD  7/1/2025 1:32 PM EDT  Workstation ID: [id number]    CT Head Without Contrast Stroke Protocol  Result Date: 7/1/2025  CT HEAD WO CONTRAST STROKE PROTOCOL Date of Exam: 7/1/2025 12:58 PM EDT Indication: Neuro deficit, acute, stroke suspected Neuro Deficit, acute, Stroke suspected. Comparison: None available. Technique: Axial CT images were obtained of the head without contrast administration.  Reconstructed coronal images were also obtained. Automated exposure control and iterative construction methods were used. Scan Time: 12:59 p.m. Results discussed with stroke team at 1:09 p.m. Findings: Parenchyma:No acute intraparenchymal hemorrhage. No loss of gray-white differentiation to suggest large territory infarct. Normal parenchymal volume. No substantial white matter disease. No midline shift or herniation. Ventricles and extra axial spaces:Normal caliber of ventricles and sulci. No extra axial fluid collection seen. Other:Orbits are grossly intact. Paranasal sinuses are clear. Mastoid air cells are clear. Calvarium is intact. No substantial intracranial atherosclerotic calcification.     Impression: Impression: No evidence of acute intracranial hemorrhage or large territory infarct. Electronically Signed: Kenneth Anne MD  7/1/2025 1:10 PM EDT  Workstation ID: [id number]          Assessment & Plan   Assessment & Plan       Left-sided weakness    Essential hypertension      Carina MCCOLLUM is a 40 y.o. female with PMHx significant for tobacco abuse, migraines who presented to [facility] as a code stroke for left sided weakness/numbness.     Left Sided Weakness  - stroke vs. TIA vs. Possible seizure vs. HTN encephalopathy, Stroke Neurology following  - MRI brain is negative for  acute process, CTA head/neck with small focal linear luminal abnormality seen along the posterior wall of the left ICA origin, favoring a carotid web vs.short segment dissection   - doubt seizure as she reports being alert/awake during her episodes, will get EEG   - loaded with plavix, continue DAPT and statin  - check A1c, lipids  - Echocardiogram w/bubble  - PT/OT/SLP  - Stroke neurology following, further stroke work-up per stroke team    HTN - uncontrolled  - control BP when okay with neurology, will likely need cardene     Tobacco Abuse:  - nicotine patch    VTE Prophylaxis:  Mechanical VTE prophylaxis orders are signed & held.          CODE STATUS:    Code Status and Medical Interventions: CPR (Attempt to Resuscitate); Full Support   Ordered at: 07/01/25 1441     Code Status (Patient has no pulse and is not breathing):    CPR (Attempt to Resuscitate)     Medical Interventions (Patient has pulse or is breathing):    Full Support     Level Of Support Discussed With:    Patient       Expected Discharge   Expected discharge date/ time has not been documented.     Odalys Viera,   07/01/25

## 2025-07-01 NOTE — ED PROVIDER NOTES
[facility]    EMERGENCY DEPARTMENT ENCOUNTER      Pt Name: Carina MCCOLLUM  MRN: [id number]  YOB: 1985  Date of evaluation: 7/1/2025  Provider: Mayco Vinson DO    CHIEF COMPLAINT       Chief Complaint   Patient presents with    Tremors    Extremity Pain    Hypertension         HISTORY OF PRESENT ILLNESS  (Location/Symptom, Timing/Onset, Context/Setting, Quality, Duration, Modifying Factors, Severity.)   Carina MCCOLLUM is a 40 y.o. female who presents to the emergency department for evaluation with her significant other secondary to her concern for her episode of left sided numbness, tingling, weakness which she notes started sometime Sunday about 2 days ago.  She has had some intermittent headaches which wax and wane in severity.  Has a remote history of migraine, headaches, but she notes she feels that her left leg had some numbness and tingling earlier this morning around 630, then the numbness went away then she notes some pain around the left hip region.  She notes she felt this a little off in the left side in her arm and leg.  She notes possible syncopal episodes, possible seizure-like activity.  She states she has not had any prior diagnosis of seizure activity, no history of known stroke or workup.  She had a outside facility evaluation at [facility] and was diagnosed with low potassium levels and syncope.  She has not had any prior neuroimaging or other advanced workup.  She states she does have a history of headaches and migraines, but nothing to this extent.  Her family notes during his episodes she does have these tremor-like activities with rhythmic jerking in her bilateral upper extremities which resolves when she comes to.  She denies any recent illness, no fever, chills, denies any chest pain, no palpitations or history of underlying arrhythmia.  She denies any other acute systemic complaints at this time.      Nursing notes were reviewed.      PAST MEDICAL HISTORY    History reviewed. No pertinent past medical history.      SURGICAL HISTORY       Past Surgical History:   Procedure Laterality Date    ESSURE TUBAL LIGATION Bilateral 2012         CURRENT MEDICATIONS       Current Facility-Administered Medications:     aspirin chewable tablet 324 mg, 324 mg, Oral, Once, Sangita Briceno, APRN    clopidogrel (PLAVIX) tablet 300 mg, 300 mg, Oral, Once, Kaity Sangita C, APRN    diphenhydrAMINE (BENADRYL) injection 25 mg, 25 mg, Intravenous, Once, Sangita Briceno C, APRN    magnesium sulfate in D5W 1g/100mL (PREMIX), 1 g, Intravenous, Once, Sangita Briceno C, APRN    metoclopramide (REGLAN) injection 10 mg, 10 mg, Intravenous, Q6H, Sangita Briceno, APRN    sodium chloride 0.9 % bolus 1,000 mL, 1,000 mL, Intravenous, Once, Sangita Briceno C, APRN    sodium chloride 0.9 % flush 10 mL, 10 mL, Intravenous, PRN, Mayco Vinson, DO    Current Outpatient Medications:     ondansetron (Zofran) 4 MG tablet, Take one pill by oral route twice a day, as needed for nausea/vomiting, Disp: 8 tablet, Rfl: 0    ALLERGIES     Patient has no known allergies.    FAMILY HISTORY       Family History   Problem Relation Age of Onset    Breast cancer Mother     COPD Mother     No Known Problems Father     No Known Problems Brother     Lung cancer Maternal Aunt           SOCIAL HISTORY       Social History     Socioeconomic History    Marital status:    Tobacco Use    Smoking status: Every Day     Current packs/day: 0.50     Average packs/day: 0.5 packs/day for 25.5 years (12.7 ttl pk-yrs)     Types: Cigarettes     Start date: 2000   Vaping Use    Vaping status: Some Days   Substance and Sexual Activity    Alcohol use: Yes     Comment: occ    Drug use: Never    Sexual activity: Defer         PHYSICAL EXAM       Vitals:    07/01/25 1430 07/01/25 1431 07/01/25 1432 07/01/25 1434   BP:  (!) 177/103 (!) 180/107 (!) 191/124   BP Location:       Patient Position:  Lying Sitting Standing   Pulse: 68 65 81     Resp:       Temp:       TempSrc:       SpO2: 98%      Weight:       Height:           Physical Exam  General : Patient is awake, alert, oriented, in no acute distress, nontoxic appearing  HEENT: Pupils are equally round, EOMI, conjunctivae clear  Neck: Neck is supple, full range of motion, trachea midline  Cardiac: Heart regular rate, rhythm, no murmurs, rubs, or gallops  Lungs: Lungs are clear to auscultation, there is no wheezing, rhonchi, or rales. There is no use of accessory muscles  Abdomen: Abdomen is soft, nontender, nondistended. There are no firm or pulsatile masses, no rebound rigidity or guarding  Musculoskeletal: No peripheral edema, 5 out of 5 strength in all 4 extremities.  No focal muscle deficits are appreciated  Neuro: Patient is awake and alert answering questions appropriately, she has 5-5 strength bilateral upper and lower extremities, no dysdiadochokinesia, she has normal finger-to-nose,  strength is equal to bilateral upper extremities, some very mild subjective paresthesia left arm, left leg, no other focal neurological decile examination, the patient is answering question appropriately, no slurred speech.  Please refer to stroke navigator for NIH score.  Motor intact, sensory intact, level of consciousness is normal  Dermatology: Skin is warm and dry  Psych: Mentation is grossly normal, cognition is grossly normal. Affect is appropriate      DIAGNOSTIC RESULTS     EKG:  All EKGs are interpreted by the Emergency Department Physician who either signs or Co-signs this chart in the absence of a cardiologist.    ECG 12 Lead ED Triage Standing Order; Acute Stroke (Onset <24 hrs)   Preliminary Result   Test Reason : ED Triage Standing Order~   Blood Pressure :   */*   mmHG   Vent. Rate :  59 BPM     Atrial Rate :  59 BPM      P-R Int : 126 ms          QRS Dur :  76 ms       QT Int : 380 ms       P-R-T Axes :  72 -38  60 degrees     QTcB Int : 376 ms      Sinus bradycardia   Left axis  deviation   Cannot rule out Anterior infarct , age undetermined   Abnormal ECG   No previous ECGs available      Referred By: EDMD           Confirmed By:       Telemetry Scan   Final Result          RADIOLOGY:     [x] Radiologist's Report Reviewed:  MRI Brain Without Contrast   Final Result   Impression:   1.No acute intracranial process identified.   2.Mucosal disease within right maxillary sinus.            Electronically Signed: Sanjay Childs MD     7/1/2025 2:21 PM EDT     Workstation ID: [id number]      CT Angiogram Head w AI Analysis of LVO   Final Result   No intracranial large vessel occlusion is identified.            Electronically Signed: Yair Aldana MD     7/1/2025 1:58 PM EDT     Workstation ID: [id number]      CT Angiogram Neck   Final Result   Impression:   CT angiogram of the neck demonstrates no evidence of high-grade stenosis, large vessel occlusion or aneurysm.      Small focal linear luminal abnormality seen along the posterior wall of the left ICA origin, favoring a carotid web, with short segment dissection an additional consideration. There is no evidence of associated thrombus formation or luminal narrowing.         Electronically Signed: Dajuan Robles MD     7/1/2025 1:37 PM EDT     Workstation ID: [id number]      CT CEREBRAL PERFUSION WITH & WITHOUT CONTRAST   Final Result   Impression:   1.Right greater than left anterior circulation diminished CBF suggestive of infarct.   2.There is a large area of right greater than left elevated Tmax (volume 81 mL) with mismatch volume 40 mL.    3.When compared to same day CTA, this does not appear to correlate to findings on preliminary review of the CTA and these findings may be secondary to suboptimal bolus timing.      Findings discussed with stroke team by Dr. Kenneth Anne via telephone on 7/1/2025 1:32 PM EDT.      Electronically Signed: Kenneth Anne MD     7/1/2025 1:32 PM EDT     Workstation ID: [id number]      CT Head Without Contrast  Stroke Protocol   Final Result   Impression:   No evidence of acute intracranial hemorrhage or large territory infarct.            Electronically Signed: Kenneth Anne MD     7/1/2025 1:10 PM EDT     Workstation ID: [id number]      XR Chest 1 View    (Results Pending)       I ordered and independently reviewed the above noted radiographic studies.      I viewed images of CT head which showed no acute abnormality, no signs of acute intracranial hemorrhage, no obvious tumor per my independent interpretation.    See radiologist's dictation for official interpretation.        LABS:    I have reviewed and interpreted all of the currently available lab results from this visit (if applicable):  Results for orders placed or performed during the hospital encounter of 07/01/25   Protime-INR    Collection Time: 07/01/25  1:09 PM    Specimen: Blood   Result Value Ref Range    Protime 13.6 12.2 - 15.3 Seconds    INR 0.99 0.89 - 1.12   aPTT    Collection Time: 07/01/25  1:09 PM    Specimen: Blood   Result Value Ref Range    PTT 27.4 22.0 - 39.0 seconds   CBC Auto Differential    Collection Time: 07/01/25  1:09 PM    Specimen: Blood   Result Value Ref Range    WBC 10.21 3.40 - 10.80 10*3/mm3    RBC 4.88 3.77 - 5.28 10*6/mm3    Hemoglobin 12.5 12.0 - 15.9 g/dL    Hematocrit 39.1 34.0 - 46.6 %    MCV 80.1 79.0 - 97.0 fL    MCH 25.6 (L) 26.6 - 33.0 pg    MCHC 32.0 31.5 - 35.7 g/dL    RDW 18.6 (H) 12.3 - 15.4 %    RDW-SD 53.4 37.0 - 54.0 fl    MPV 9.4 6.0 - 12.0 fL    Platelets 397 140 - 450 10*3/mm3    Neutrophil % 70.3 42.7 - 76.0 %    Lymphocyte % 22.0 19.6 - 45.3 %    Monocyte % 6.0 5.0 - 12.0 %    Eosinophil % 0.6 0.3 - 6.2 %    Basophil % 0.6 0.0 - 1.5 %    Immature Grans % 0.5 0.0 - 0.5 %    Neutrophils, Absolute 7.18 (H) 1.70 - 7.00 10*3/mm3    Lymphocytes, Absolute 2.25 0.70 - 3.10 10*3/mm3    Monocytes, Absolute 0.61 0.10 - 0.90 10*3/mm3    Eosinophils, Absolute 0.06 0.00 - 0.40 10*3/mm3    Basophils, Absolute 0.06 0.00 -  0.20 10*3/mm3    Immature Grans, Absolute 0.05 0.00 - 0.05 10*3/mm3    nRBC 0.0 0.0 - 0.2 /100 WBC   Comprehensive Metabolic Panel    Collection Time: 07/01/25  1:09 PM    Specimen: Blood   Result Value Ref Range    Glucose 111 (H) 65 - 99 mg/dL    BUN 3.8 (L) 6.0 - 20.0 mg/dL    Creatinine 0.56 (L) 0.57 - 1.00 mg/dL    Sodium 139 136 - 145 mmol/L    Potassium 3.7 3.5 - 5.2 mmol/L    Chloride 105 98 - 107 mmol/L    CO2 22.6 22.0 - 29.0 mmol/L    Calcium 9.0 8.6 - 10.5 mg/dL    Total Protein 7.4 6.0 - 8.5 g/dL    Albumin 4.5 3.5 - 5.2 g/dL    ALT (SGPT) 8 1 - 33 U/L    AST (SGOT) 20 1 - 32 U/L    Alkaline Phosphatase 87 39 - 117 U/L    Total Bilirubin 0.4 0.0 - 1.2 mg/dL    Globulin 2.9 gm/dL    A/G Ratio 1.6 g/dL    BUN/Creatinine Ratio 6.8 (L) 7.0 - 25.0    Anion Gap 11.4 5.0 - 15.0 mmol/L    eGFR 117.8 >60.0 mL/min/1.73   BNP    Collection Time: 07/01/25  1:09 PM    Specimen: Blood   Result Value Ref Range    proBNP 85.8 0.0 - 450.0 pg/mL   High Sensitivity Troponin T    Collection Time: 07/01/25  1:09 PM    Specimen: Blood   Result Value Ref Range    HS Troponin T <6 <14 ng/L   Green Top (Gel)    Collection Time: 07/01/25  1:09 PM   Result Value Ref Range    Extra Tube Hold for add-ons.    Lavender Top    Collection Time: 07/01/25  1:09 PM   Result Value Ref Range    Extra Tube hold for add-on    Gold Top - SST    Collection Time: 07/01/25  1:09 PM   Result Value Ref Range    Extra Tube Hold for add-ons.    Gray Top    Collection Time: 07/01/25  1:09 PM   Result Value Ref Range    Extra Tube Hold for add-ons.    Light Blue Top    Collection Time: 07/01/25  1:09 PM   Result Value Ref Range    Extra Tube Hold for add-ons.    POC CHEM 8    Collection Time: 07/01/25  1:10 PM    Specimen: Blood   Result Value Ref Range    Glucose 111 70 - 130 mg/dL    BUN <3 (L) 8 - 26 mg/dL    Creatinine 0.50 (L) 0.60 - 1.30 mg/dL    Sodium 140 138 - 146 mmol/L    POC Potassium 3.6 3.5 - 4.9 mmol/L    Chloride 104 98 - 109 mmol/L     Total CO2 21 (L) 24 - 29 mmol/L    Hemoglobin 14.3 12.0 - 17.0 g/dL    Hematocrit 42 38 - 51 %    Ionized Calcium 1.19 (L) 1.20 - 1.32 mmol/L    eGFR 121.0 >60.0 mL/min/1.73   Urine Drug Screen - Urine, Clean Catch    Collection Time: 07/01/25  1:27 PM    Specimen: Urine, Clean Catch   Result Value Ref Range    THC, Screen, Urine Positive (A) Negative    Phencyclidine (PCP), Urine Negative Negative    Cocaine Screen, Urine Negative Negative    Methamphetamine, Ur Negative Negative    Opiate Screen Negative Negative    Amphetamine Screen, Urine Negative Negative    Benzodiazepine Screen, Urine Negative Negative    Tricyclic Antidepressants Screen Negative Negative    Methadone Screen, Urine Negative Negative    Barbiturates Screen, Urine Negative Negative    Oxycodone Screen, Urine Negative Negative    Buprenorphine, Screen, Urine Negative Negative   Pregnancy, Urine - Urine, Clean Catch    Collection Time: 07/01/25  1:27 PM    Specimen: Urine, Clean Catch   Result Value Ref Range    HCG, Urine QL Negative Negative   Fentanyl, Urine - Urine, Clean Catch    Collection Time: 07/01/25  1:27 PM    Specimen: Urine, Clean Catch   Result Value Ref Range    Fentanyl, Urine Negative Negative   ECG 12 Lead ED Triage Standing Order; Acute Stroke (Onset <24 hrs)    Collection Time: 07/01/25  2:30 PM   Result Value Ref Range    QT Interval 380 ms    QTC Interval 376 ms        If labs were ordered, I independently reviewed the results and considered them in treating the patient.      EMERGENCY DEPARTMENT COURSE and DIFFERENTIAL DIAGNOSIS/MDM:   Vitals:  AS OF 14:36 EDT    BP - (!) 191/124  HR - 81  TEMP - 98.9 °F (37.2 °C) (Oral)  O2 SATS - 98%      Orders placed during this visit:  Orders Placed This Encounter   Procedures    CT Head Without Contrast Stroke Protocol    CT Angiogram Head w AI Analysis of LVO    CT Angiogram Neck    CT CEREBRAL PERFUSION WITH & WITHOUT CONTRAST    XR Chest 1 View    MRI Brain Without Contrast     [place] Draw    Protime-INR    aPTT    CBC Auto Differential    Comprehensive Metabolic Panel    BNP    High Sensitivity Troponin T    Urine Drug Screen - Urine, Clean Catch    Pregnancy, Urine - Urine, Clean Catch    Fentanyl, Urine - Urine, Clean Catch    High Sensitivity Troponin T 1Hr    NPO Diet NPO Type: Strict NPO    Initiate Code Stroke    Perform NIH Stroke Scale    Measure Actual Weight    Head of Bed 30 Degrees or Less    Undress and Gown    Continuous Pulse Oximetry    Vital Signs    Neuro Checks    Notify Provider SBP <80 or >200    Notify Provider for SBP > 140 if Hemorrhagic Stroke    No Hypotonic Fluids    Nursing Dysphagia Screening (Complete Prior to Giving anything PO)    RN to Place Order SLP Consult (IF swallow screen failed) - Eval & Treat Choosing Reason of RN Dysphagia Screen Failed    Orthostatic Blood Pressure    Inpatient Neurology Consult Stroke    Oxygen Therapy- Nasal Cannula; Titrate 1-6 LPM Per SpO2; 90 - 95%    POC CHEM 8    ECG 12 Lead ED Triage Standing Order; Acute Stroke (Onset <24 hrs)    Telemetry Scan    EEG    Insert Large-Bore Peripheral IV - Right AC Preferred    CBC & Differential    Green Top (Gel)    Lavender Top    Gold Top - SST    Gray Top    Light Blue Top       All labs have been independently reviewed by me.  All radiology studies have been reviewed by me and the radiologist dictating the report.  All EKG's have been independently viewed and interpreted by me.      Discussion below represents my analysis of pertinent findings related to patient's condition, differential diagnosis, treatment plan and final disposition.    Differential diagnosis:  The differential diagnosis associated with the patient's presentation includes: Paresthesia, syncope, seizure, TIA, CVA, electrolyte abnormalities, nonepileptic seizure activity, arrhythmia    Additional sources  Discussed/ obtained information from independent historians:   [] Spouse  [] Parent  [x] Family member  []  Friend  [] EMS   [] Other:    External (non-ED) record review:   [] Inpatient record:   [] Office record:   [] Outpatient record:   [] Prior Outpatient labs:   [] Prior Outpatient radiology:   [] Primary Care record:   [] Outside ED record:   [] Other:     Patient's care impacted by:   [] Diabetes  [] Hypertension  [] CHF  [] Hyperlipidemia  [] Coronary Artery Disease   [] COPD   [] Cancer   [x] Tobacco Abuse   [] Substance Abuse    [] Other:     Care significantly affected by Social Determinants of Health (housing and economic circumstances, unemployment)    [] Yes     [x] No   If yes, Patient's care significantly limited by Social Determinants of Health including:   [] Inadequate housing   [] Low income   [] Alcoholism and drug addiction in family   [] Problems related to primary support group   [] Unemployment   [] Problems related to employment   [] Other Social Determinants of Health:       MEDICATIONS ADMINISTERED IN ED:  Medications   sodium chloride 0.9 % flush 10 mL (has no administration in time range)   aspirin chewable tablet 324 mg (has no administration in time range)   clopidogrel (PLAVIX) tablet 300 mg (has no administration in time range)   sodium chloride 0.9 % bolus 1,000 mL (has no administration in time range)   magnesium sulfate in D5W 1g/100mL (PREMIX) (has no administration in time range)   metoclopramide (REGLAN) injection 10 mg (has no administration in time range)   diphenhydrAMINE (BENADRYL) injection 25 mg (has no administration in time range)   iopamidol (ISOVUE-370) 76 % injection 100 mL (125 mL Intravenous Given 7/1/25 1319)              This is a pleasant 40-year-old female who has had recurrent episodes of left-sided numbness, tingling intermittent weakness of the left arm, left leg, really is started about 2.5 days ago, noticed the symptoms more severe about 630 this morning with almost what she describes as seizure-like activity.  She not have any history of seizure, no history of  "prior neurological workup.  She has had history of migraines and headaches, but not to this extent where she has had left-sided weakness, she notes she feels \"groggy\" she smokes tobacco, does not drink alcohol on a regular basis.  She denies any drugs of abuse.  The patient now complaining of left-sided arm and leg pain more so than the paresthesia and numbness from earlier.  She does not have any other focal neurological deficit on examination, no motor weakness, no slurred speech.  We did proceed forward with neuroimaging, stroke workup and assessment she has not had any prior neurological evaluations.  Patient labs reveal CBC white count of 10.2 with hemoglobin 12, kidney function liver function and electrolytes are stable.  UDS THC positive, the CT scan images did not reveal any acute intracranial abnormality, no tumor, there is a questionable flap which could be a carotid web on her left ICA versus small dissection.  With the stroke neurology team they are recommending dual antiplatelet therapy, will continue with monitoring.  Her MRI does not reveal any acute ischemic abnormality.  Patient will be admitted to the hospital for further workup and neurochecks.  Blood pressure does remain slightly elevated as well.  Case discussed with hospitalist Dr. Finch.        PROCEDURES:  Procedures    CRITICAL CARE TIME    Total Critical Care time was 30 minutes, excluding separately reportable procedures.  Patient with neurological deficit, possible seizure activity, left-sided weakness, numbness, concern for underlying stroke.  This did require multiple neurochecks, reassessments, discussions with multiple consultants. There was a high probability of clinically significant/life threatening deterioration in the patient's condition which required my urgent intervention.      FINAL IMPRESSION      1. Left sided numbness    2. Recurrent headache    3. Hypertension, unspecified type          DISPOSITION/PLAN     ED " Disposition       ED Disposition   Decision to Admit    Condition   --    Comment   --                   Comment: Please note this report has been produced using speech recognition software.      Mayco Vinson DO  Attending Emergency Physician         Mayco Vinson DO  07/01/25 1809

## 2025-07-01 NOTE — PLAN OF CARE
Goal Outcome Evaluation:  Plan of Care Reviewed With: patient        Progress: improving  Outcome Evaluation: VSS. Tele=NSR.  NIHSS=0. Seizure precautions in place. Pt educated. Repleting K+ per protocol. NAD at this time.

## 2025-07-01 NOTE — CONSULTS
"Stroke Consult Note    Patient Name: Carina MCCOLLUM   MRN: [id number]  Age: 40 y.o.  Sex: female  : 1985    Primary Care Physician: Ambar Ornelas PA-C  Referring Physician:  DO Karel    TIME STROKE TEAM CALLED:  1252 EST     TIME PATIENT SEEN: [id number]    Handedness: right  Race:      Chief Complaint/Reason for Consultation: Syncope, jerking episodes, left sided weakness/pain    HPI: Carina MCCOLLUM is a 40-year-old female with past medical history of tobacco abuse, HTN, syncope, vertigo presented to [facility] emergency department for further evaluation of left-sided numbness, tingling, weakness and soreness that started on  (2025).  In April, she reports she was diagnosed with syncopal episodes at [facility] emergency department.  She tells me she has continued to have these syncopal episodes which are usually accompanied by headache and possible seizure-like activity.  Her  at the bedside states that \"every time she passes out her whole body shakes\".  She denies any loss of bowel or bladder control during these episodes.  On initial examination patient complains of her left upper and lower extremity feeling \"bruised\".  She denies any recent falls, she states that her  is always there to catch her when she has these episodes.    Of note, patient was able to walk with a limp.  She was initially dangling her LUE but when asked she was able to take her facial piercings out with this extremity.  She tells me the syncopal episodes usually happen when going from sitting to standing.  She has history of 1 miscarriage in the past.  She denies any known clotting disorders.      CT head revealed no evidence of acute intracranial normality.  CTA head/neck reveals small focal linear luminal abnormality seen along the posterior wall of the left ICA origin which could be a plaque, carotid web or short segment dissection.  There is no " associated thrombus or luminal narrowing.  CT perfusion shows a large area of right greater than left elevated Tmax (volume 81 ml) with mismatch volume 40 mL, when compared to CTA there is not a target vessel that correlates to this perfusion abnormality and is likely secondary to suboptimal bolus timing.  Not a candidate for IV thrombolytic therapy due to extended last known well.  She is not a candidate for neurovascular intervention as there is no LVO on CT scan.  MRI brain reveals no acute intracranial process, there are a couple subcortical white matter FLAIR hyperintensities in the right frontal lobe that are nonspecific.  She will be admitted to the hospitalist service for further evaluation    Blood pressure on arrival to the emergency department was 196/96.     Last Known Normal Date/Time:6/29/2025, time unknown    Review of Systems   Respiratory:  Positive for shortness of breath.    Neurological:  Positive for tremors, syncope, weakness, light-headedness, numbness and headaches.      No past medical history on file.  Past Surgical History:   Procedure Laterality Date    ESSURE TUBAL LIGATION Bilateral 2012     Family History   Problem Relation Age of Onset    Breast cancer Mother     COPD Mother     No Known Problems Father     No Known Problems Brother     Lung cancer Maternal Aunt      Social History     Socioeconomic History    Marital status:    Tobacco Use    Smoking status: Every Day     Current packs/day: 0.50     Average packs/day: 0.5 packs/day for 25.5 years (12.7 ttl pk-yrs)     Types: Cigarettes     Start date: 2000   Vaping Use    Vaping status: Some Days   Substance and Sexual Activity    Alcohol use: Yes     Comment: occ    Drug use: Never    Sexual activity: Defer     No Known Allergies  Prior to Admission medications    Medication Sig Start Date End Date Taking? Authorizing Provider   ondansetron (Zofran) 4 MG tablet Take one pill by oral route twice a day, as needed for  nausea/vomiting 8/22/23   Ambar Ornelas PA-C         Temp:  [98.9 °F (37.2 °C)] 98.9 °F (37.2 °C)  Heart Rate:  [82] 82  Resp:  [18] 18  BP: (196)/(96) 196/96  Neurological Exam  Mental Status  Awake, alert and oriented to person, place and time. Oriented to person, place, time and situation. Oriented to person, place, and time. Memory is normal. Recent and remote memory are intact. Speech is normal. Language is fluent with no aphasia. Attention and concentration are normal.    Cranial Nerves  CN II: Visual fields full to confrontation.  CN III, IV, VI: Extraocular movements intact bilaterally. Pupils equal round and reactive to light bilaterally.  CN V: Facial sensation is normal.  CN VII: Full and symmetric facial movement.  CN VIII: Hearing appears intact.  CN XII: Tongue midline without atrophy or fasciculations.    Motor  Normal muscle bulk throughout. Normal muscle tone. Strength is 5/5 in all four extremities except as noted.  LUE 4/5, no drift.  LLE 4/5, no drift.  Exam could be effort dependent and is somewhat inconsistent .    Sensory  Sensation is intact to light touch, pinprick, vibration and proprioception in all four extremities. No right-sided hemispatial neglect. No left-sided hemispatial neglect.    Coordination    No dysmetria noted.    Gait   Normal gait. Normal gait.  Not observed.      Physical Exam  Vitals and nursing note reviewed.   Constitutional:       General: She is not in acute distress.     Appearance: Normal appearance. She is not ill-appearing or toxic-appearing.   HENT:      Head: Normocephalic and atraumatic.      Mouth/Throat:      Mouth: Mucous membranes are moist.   Eyes:      Extraocular Movements: Extraocular movements intact.      Pupils: Pupils are equal, round, and reactive to light.   Cardiovascular:      Rate and Rhythm: Normal rate and regular rhythm.      Pulses: Normal pulses.      Heart sounds: Normal heart sounds.   Pulmonary:      Effort: Pulmonary effort is  normal. No respiratory distress.      Breath sounds: Normal breath sounds.   Musculoskeletal:      Cervical back: Normal range of motion and neck supple. No rigidity or tenderness.   Skin:     General: Skin is warm and dry.      Capillary Refill: Capillary refill takes less than 2 seconds.   Neurological:      General: No focal deficit present.      Mental Status: She is oriented to person, place, and time. Mental status is at baseline.      Cranial Nerves: No cranial nerve deficit.      Sensory: No sensory deficit.      Motor: Weakness present.      Coordination: Coordination normal.      Gait: Gait is intact. Gait normal.   Psychiatric:         Attention and Perception: Attention normal.         Mood and Affect: Mood is anxious.         Speech: Speech normal.         Behavior: Behavior is hyperactive. Behavior is cooperative.         Cognition and Memory: Cognition and memory normal.         Judgment: Judgment normal.         Acute Stroke Data    Thrombolytic Inclusion / Exclusion Criteria    Time: 12:52 EDT  Person Administering Scale: CARLOS Estrada      YES NO INCLUSION CRITERIA CLASS I   [x] []   Suspected diagnosis of acute ischemic stroke with measureable neurological deficit.  Low NIHSS with disabling stroke symptoms.   [] [x]   Onset of stroke symptoms < 3 hours before beginning treatment >/ 18 years old  Stroke symptom onset = time patient was last seen well or without symptoms (LKW)   [] [x]   Onset of symptoms between 3-4.5 hours: >/= 80 years old (safe Class IIa) with history of   both diabetes and prior CVA  (reasonable Class IIb) AND NIHSS </= 25  *If not eligible for IV Thrombolytic consider neuro intervention for LKW within 24 hours     YES NO EXCLUSION CRITERIA (CONTRAINDICATIONS) CLASS III EVIDENCE HARM   [] []   Blood pressure >185/110 medically refractory to IV medications   [] []   Active bleeding at a non-compressible site   [] []   Active intracranial hemorrhage (ICH)   [] []   Symptoms  suggestive of subarachnoid hemorrhage (SAH)   [] []   GI bleed within 21 days   [] []   Ischemic stroke within 3 months   [] []   Severe head trauma within 3 months    [] []   Intracranial or intraspinal surgery within 3 months   [] []   Current GI malignancy   [] []   Intracranial neoplasm   [] []   Infective endocarditis   [] []   Aortic arch dissection   [] []   Active coagulopathy with  INR >1.7, platelets <100,000, PTT > 40 sec, PT > 15 sec   *For warfarin, administration can begin before blood tests resulted. Discontinue for above values.    [] []   Treatment dose* of LMWH (Lovenox) in last 24 hours  *prophylactic dosages are not a contraindication   [] []   Concurrent use of antiplatelet agents' glycoprotein inhibitors IIb/IIIa (Integrilin, etc.)   [] []   Thrombin or factor Xa inhibitors (Eliquis, Xarelto, Arixtra) taken in last 48 hours     YES NO CLASS II: AIS WITH THE FOLLOWING CONDITIONS -   TREATMENT RISKS SHOULD BE WEIGHED AGAINST POSSIBLE BENEFITS.    [] []   Major trauma in last 14 days, recent major surgery in last 14 days, intracranial arterial dissection, giant unruptured and unsecured intracranial aneurysm, pericarditis     [] []   The risks and benefits have been discussed with the patient or family related to the administration of IV thrombolytic therapy for stroke symptoms.   [] []   I have discussed and reviewed the patient's case and imaging with the attending prior to IV thrombolytic therapy.   TIME N/a Time IV thrombolytic administered       Hospital Meds:  Scheduled-   Infusions- No current facility-administered medications for this encounter.     PRNs-     Functional Status Prior to Current Stroke/Rahsid Score: 0    NIH Stroke Scale  Time: 12:58 EDT  Person Administering Scale: CARLOS Estrada    Interval: baseline  1a. Level of Consciousness: 0-->Alert, keenly responsive  1b. LOC Questions: 0-->Answers both questions correctly  1c. LOC Commands: 0-->Performs both tasks correctly  2.  Best Gaze: 0-->Normal  3. Visual: 0-->No visual loss  4. Facial Palsy: 0-->Normal symmetrical movements  5a. Motor Arm, Left: 0-->No drift, limb holds 90 (or 45) degrees for full 10 secs  5b. Motor Arm, Right: 0-->No drift, limb holds 90 (or 45) degrees for full 10 secs  6a. Motor Leg, Left: 0-->No drift, leg holds 30 degree position for full 5 secs  6b. Motor Leg, Right: 0-->No drift, leg holds 30 degree position for full 5 secs  7. Limb Ataxia: 0-->Absent  8. Sensory: 0-->Normal, no sensory loss  9. Best Language: 0-->No aphasia, normal  10. Dysarthria: 0-->Normal  11. Extinction and Inattention (formerly Neglect): 0-->No abnormality    Total (NIH Stroke Scale): 0      Results Reviewed:  I have personally reviewed current lab, radiology, and data.  MRI Brain Without Contrast  Result Date: 7/1/2025  Impression: 1.No acute intracranial process identified. 2.Mucosal disease within right maxillary sinus. Electronically Signed: Sanjay Childs MD  7/1/2025 2:21 PM EDT  Workstation ID: [id number]    CT Angiogram Head w AI Analysis of LVO  Result Date: 7/1/2025  No intracranial large vessel occlusion is identified. Electronically Signed: Yair Aldana MD  7/1/2025 1:58 PM EDT  Workstation ID: [id number]    CT Angiogram Neck  Result Date: 7/1/2025  Impression: CT angiogram of the neck demonstrates no evidence of high-grade stenosis, large vessel occlusion or aneurysm. Small focal linear luminal abnormality seen along the posterior wall of the left ICA origin, favoring a carotid web, with short segment dissection an additional consideration. There is no evidence of associated thrombus formation or luminal narrowing. Electronically Signed: Dajuan Robles MD  7/1/2025 1:37 PM EDT  Workstation ID: [id number]    CT CEREBRAL PERFUSION WITH & WITHOUT CONTRAST  Result Date: 7/1/2025  Impression: 1.Right greater than left anterior circulation diminished CBF suggestive of infarct. 2.There is a large area of right greater than left  elevated Tmax (volume 81 mL) with mismatch volume 40 mL. 3.When compared to same day CTA, this does not appear to correlate to findings on preliminary review of the CTA and these findings may be secondary to suboptimal bolus timing. Findings discussed with stroke team by Dr. Kenneth Anne via telephone on 7/1/2025 1:32 PM EDT. Electronically Signed: Kenneth Anne MD  7/1/2025 1:32 PM EDT  Workstation ID: [id number]    CT Head Without Contrast Stroke Protocol  Result Date: 7/1/2025  Impression: No evidence of acute intracranial hemorrhage or large territory infarct. Electronically Signed: Kenneth Anne MD  7/1/2025 1:10 PM EDT  Workstation ID: [id number]    WBC   Date Value Ref Range Status   07/01/2025 10.21 3.40 - 10.80 10*3/mm3 Final     RBC   Date Value Ref Range Status   07/01/2025 4.88 3.77 - 5.28 10*6/mm3 Final     Hemoglobin   Date Value Ref Range Status   07/01/2025 14.3 12.0 - 17.0 g/dL Final     Comment:     Serial Number: [id number]:  [id number]   07/01/2025 12.5 12.0 - 15.9 g/dL Final     Hematocrit   Date Value Ref Range Status   07/01/2025 42 38 - 51 % Final   07/01/2025 39.1 34.0 - 46.6 % Final     MCV   Date Value Ref Range Status   07/01/2025 80.1 79.0 - 97.0 fL Final     MCH   Date Value Ref Range Status   07/01/2025 25.6 (L) 26.6 - 33.0 pg Final     MCHC   Date Value Ref Range Status   07/01/2025 32.0 31.5 - 35.7 g/dL Final     RDW   Date Value Ref Range Status   07/01/2025 18.6 (H) 12.3 - 15.4 % Final     RDW-SD   Date Value Ref Range Status   07/01/2025 53.4 37.0 - 54.0 fl Final     MPV   Date Value Ref Range Status   07/01/2025 9.4 6.0 - 12.0 fL Final     Platelets   Date Value Ref Range Status   07/01/2025 397 140 - 450 10*3/mm3 Final     Neutrophil %   Date Value Ref Range Status   07/01/2025 70.3 42.7 - 76.0 % Final     Lymphocyte %   Date Value Ref Range Status   07/01/2025 22.0 19.6 - 45.3 % Final     Monocyte %   Date Value Ref Range Status   07/01/2025 6.0 5.0 - 12.0 % Final      Eosinophil %   Date Value Ref Range Status   07/01/2025 0.6 0.3 - 6.2 % Final     Basophil %   Date Value Ref Range Status   07/01/2025 0.6 0.0 - 1.5 % Final     Immature Grans %   Date Value Ref Range Status   07/01/2025 0.5 0.0 - 0.5 % Final     Neutrophils, Absolute   Date Value Ref Range Status   07/01/2025 7.18 (H) 1.70 - 7.00 10*3/mm3 Final     Lymphocytes, Absolute   Date Value Ref Range Status   07/01/2025 2.25 0.70 - 3.10 10*3/mm3 Final     Monocytes, Absolute   Date Value Ref Range Status   07/01/2025 0.61 0.10 - 0.90 10*3/mm3 Final     Eosinophils, Absolute   Date Value Ref Range Status   07/01/2025 0.06 0.00 - 0.40 10*3/mm3 Final     Basophils, Absolute   Date Value Ref Range Status   07/01/2025 0.06 0.00 - 0.20 10*3/mm3 Final     Immature Grans, Absolute   Date Value Ref Range Status   07/01/2025 0.05 0.00 - 0.05 10*3/mm3 Final     nRBC   Date Value Ref Range Status   07/01/2025 0.0 0.0 - 0.2 /100 WBC Final     Lab Results   Component Value Date    GLUCOSE 111 (H) 07/01/2025    BUN 3.8 (L) 07/01/2025    CREATININE 0.50 (L) 07/01/2025     07/01/2025    K 3.7 07/01/2025     07/01/2025    CALCIUM 9.0 07/01/2025    PROTEINTOT 7.4 07/01/2025    ALBUMIN 4.5 07/01/2025    ALT 8 07/01/2025    AST 20 07/01/2025    ALKPHOS 87 07/01/2025    BILITOT 0.4 07/01/2025    GLOB 2.9 07/01/2025    AGRATIO 1.6 07/01/2025    BCR 6.8 (L) 07/01/2025    ANIONGAP 11.4 07/01/2025    EGFR 121.0 07/01/2025 7/1/2025 UDS positive for THC       Assessment/Plan:    This is a 40-year-old female with past medical history of tobacco abuse, HTN, syncope, vertigo presented to [facility] emergency department for further evaluation of left-sided numbness, tingling, weakness and soreness that started on Sunday (6/29/2025). CT head revealed no evidence of acute intracranial normality.  CTA head/neck reveals small focal linear luminal abnormality seen along the posterior wall of the left ICA origin which could  be a plaque, carotid web or short segment dissection.  CT perfusion shows a large area of right greater than left elevated Tmax (volume 81 ml) with mismatch volume 40 mL, when compared to CTA there is not a target vessel that correlates to this perfusion abnormality and is likely secondary to suboptimal bolus timing.  She is not a candidate for IV thrombolytic therapy due to extended last known well.  She is not a candidate for neurovascular intervention as there is no LVO on CT scan.  MRI brain reveals no acute intracranial process, there are a couple subcortical white matter FLAIR hyperintensities in the right frontal lobe that are nonspecific.      Antiplatelet PTA: none  Anticoagulant PTA: none        #Left sided weakness/numbness  #Headache  # Seizure-like activity  Differential diagnosis includes TIA versus complex migraine versus seizure versus hypertensive urgency.  FND can also not be ruled out.  -TIA/CVA order set without thrombolytic therapy has been initiated  -NPO until bedside nursing dysphagia screen completed  -TTE w/ bubble pending  -A1c and lipid panel in AM  -Migraine cocktail  -Orthostatic vital signs  -EEG pending  -Meds: Aspirin 324 mg and Plavix 300 mg loading dose in the emergency department.  Continue aspirin 81 mg and Plavix 75 mg daily.  -Activity as tolerated, fall risk precautions  -PT/OT/SLP evaluation    #  Essential hypertension  - Okay to gradually begin to lower blood pressure, goal -160  -Primary team to manage    #Hyperlipidemia  -Lipid panel in AM  -Atorvastatin 80mg nightly    #Tobacco abuse, ongoing  -Daily tobacco cessation education to be provided  -Patient may require NRT during admission    Plan of care was discussed with Dr. Conner (vascular Neurologist), patient, patient's , primary nurse, Dr. Vinson, and radiologist.  Stroke neurology will continue to follow.  Please call with any questions or concerns.  Thank you for this consult.     Sangita Briceno,  APRN  July 1, 2025  12:52 EDT

## 2025-07-01 NOTE — ED NOTES
Carina MCCOLLUM    Nursing Report ED to Floor:  Mental status: ANOX4  Ambulatory status: UP WITH STAND-BY  Oxygen Therapy:  RA  Cardiac Rhythm: NSR  Admitted from: HOME  Safety Concerns:  N/A  Precautions: SEIZURE  Social Issues: N/A  ED Room #:  17    ED Nurse Phone Extension - [phone] or may call [phone].      HPI:   Chief Complaint   Patient presents with    Tremors    Extremity Pain    Hypertension       Past Medical History:  History reviewed. No pertinent past medical history.     Past Surgical History:  Past Surgical History:   Procedure Laterality Date    ESSURE TUBAL LIGATION Bilateral 2012        Admitting Doctor:   Odalys Viera DO    Consulting Provider(s):  Consults       Date and Time Order Name Status Description    7/1/2025 12:59 PM Inpatient Neurology Consult Stroke               Admitting Diagnosis:   The primary encounter diagnosis was Left sided numbness. Diagnoses of Recurrent headache and Hypertension, unspecified type were also pertinent to this visit.    Most Recent Vitals:   Vitals:    07/01/25 1430 07/01/25 1431 07/01/25 1432 07/01/25 1434   BP:  (!) 177/103 (!) 180/107 (!) 191/124   BP Location:       Patient Position:  Lying Sitting Standing   Pulse: 68 65 81    Resp:       Temp:       TempSrc:       SpO2: 98%      Weight:       Height:           Active LDAs/IV Access:   Lines, Drains & Airways       Active LDAs       Name Placement date Placement time Site Days    Peripheral IV 07/01/25 1308 18 G Left Antecubital 07/01/25  1308  Antecubital  less than 1                    Labs (abnormal labs have a star):   Labs Reviewed   CBC WITH AUTO DIFFERENTIAL - Abnormal; Notable for the following components:       Result Value    MCH 25.6 (*)     RDW 18.6 (*)     Neutrophils, Absolute 7.18 (*)     All other components within normal limits   COMPREHENSIVE METABOLIC PANEL - Abnormal; Notable for the following components:    Glucose 111 (*)     BUN 3.8 (*)     Creatinine 0.56 (*)     BUN/Creatinine  Ratio 6.8 (*)     All other components within normal limits    Narrative:     GFR Categories in Chronic Kidney Disease (CKD)              GFR Category          GFR (mL/min/1.73)    Interpretation  G1                    90 or greater        Normal or high (1)  G2                    60-89                Mild decrease (1)  G3a                   45-59                Mild to moderate decrease  G3b                   30-44                Moderate to severe decrease  G4                    15-29                Severe decrease  G5                    14 or less           Kidney failure    (1)In the absence of evidence of kidney disease, neither GFR category G1 or G2 fulfill the criteria for CKD.    eGFR calculation 2021 CKD-EPI creatinine equation, which does not include race as a factor   URINE DRUG SCREEN - Abnormal; Notable for the following components:    THC, Screen, Urine Positive (*)     All other components within normal limits    Narrative:     Cutoff For Drugs Screened:    Amphetamines               500 ng/ml  Barbiturates               200 ng/ml  Benzodiazepines            150 ng/ml  Cocaine                    150 ng/ml  Methadone                  200 ng/ml  Opiates                    100 ng/ml  Phencyclidine               25 ng/ml  THC                         50 ng/ml  Methamphetamine            500 ng/ml  Tricyclic Antidepressants  300 ng/ml  Oxycodone                  100 ng/ml  Buprenorphine               10 ng/ml    The normal value for all drugs tested is negative. This report includes unconfirmed screening results, with the cutoff values listed, to be used for medical treatment purposes only.  Unconfirmed results must not be used for non-medical purposes such as employment or legal testing.  Clinical consideration should be applied to any drug of abuse test, particularly when unconfirmed results are used.     POCT CHEM 8 - Abnormal; Notable for the following components:    BUN <3 (*)     Creatinine 0.50 (*)      Total CO2 21 (*)     Ionized Calcium 1.19 (*)     All other components within normal limits   PROTIME-INR - Normal   APTT - Normal    Narrative:     PTT = The equivalent PTT values for the therapeutic range of heparin levels at 0.3 to 0.5 U/ml are 60 to 70 seconds.   BNP (IN-HOUSE) - Normal    Narrative:     This assay is used as an aid in the diagnosis of individuals suspected of having heart failure. It can be used as an aid in the diagnosis of acute decompensated heart failure (ADHF) in patients presenting with signs and symptoms of ADHF to the emergency department (ED). In addition, NT-proBNP of <300 pg/mL indicates ADHF is not likely.    Age Range Result Interpretation  NT-proBNP Concentration (pg/mL:      <50             Positive            >450                   Gray                 300-450                    Negative             <300    50-75           Positive            >900                  Gray                300-900                  Negative            <300      >75             Positive            >1800                  Gray                300-1800                  Negative            <300   TROPONIN - Normal    Narrative:     High Sensitive Troponin T Reference Range:  <14.0 ng/L- Negative Female for AMI  <22.0 ng/L- Negative Male for AMI  >=14 - Abnormal Female indicating possible myocardial injury.  >=22 - Abnormal Male indicating possible myocardial injury.   Clinicians would have to utilize clinical acumen, EKG, Troponin, and serial changes to determine if it is an Acute Myocardial Infarction or myocardial injury due to an underlying chronic condition.        PREGNANCY, URINE - Normal   FENTANYL, URINE - Normal    Narrative:     Negative Threshold:      Fentanyl 5 ng/mL     The normal value for the drug tested is negative. This report includes final unconfirmed screening results to be used for medical treatment purposes only. Unconfirmed results must not be used for non-medical purposes such as  employment or legal testing. Clinical consideration should be applied to any drug of abuse test, particularly when unconfirmed results are used.          RAINBOW DRAW    Narrative:     The following orders were created for panel order [place] Draw.  Procedure                               Abnormality         Status                     ---------                               -----------         ------                     Green Top (Gel)[[id number]]                                  Final result               Lavender Top[[id number]]                                     Final result               Gold Top - SST[[id number]]                                   Final result               Gray Top[[id number]]                                         Final result               Light Blue Top[[id number]]                                   Final result                 Please view results for these tests on the individual orders.   HIGH SENSITIVITIY TROPONIN T 1HR   CBC AND DIFFERENTIAL    Narrative:     The following orders were created for panel order CBC & Differential.  Procedure                               Abnormality         Status                     ---------                               -----------         ------                     CBC Auto Differential[[id number]]        Abnormal            Final result                 Please view results for these tests on the individual orders.   GREEN TOP   LAVENDER TOP   GOLD TOP - SST   GRAY TOP   LIGHT BLUE TOP       Meds Given in ED:   Medications   sodium chloride 0.9 % flush 10 mL (has no administration in time range)   sodium chloride 0.9 % bolus 1,000 mL (1,000 mL Intravenous New Bag 7/1/25 1437)   metoclopramide (REGLAN) injection 10 mg (10 mg Intravenous Given 7/1/25 1436)   iopamidol (ISOVUE-370) 76 % injection 100 mL (125 mL Intravenous Given 7/1/25 1319)   aspirin chewable tablet 324 mg (324 mg Oral Given 7/1/25 1436)   clopidogrel (PLAVIX) tablet 300 mg (300 mg Oral Given  7/1/25 1436)   magnesium sulfate in D5W 1g/100mL (PREMIX) (1 g Intravenous New Bag 7/1/25 1437)   diphenhydrAMINE (BENADRYL) injection 25 mg (25 mg Intravenous Given 7/1/25 1436)           Last NIH score:  Interval: baseline  1a. Level of Consciousness: 0-->Alert, keenly responsive  1b. LOC Questions: 0-->Answers both questions correctly  1c. LOC Commands: 0-->Performs both tasks correctly  2. Best Gaze: 0-->Normal  3. Visual: 0-->No visual loss  4. Facial Palsy: 0-->Normal symmetrical movements  5a. Motor Arm, Left: 0-->No drift, limb holds 90 (or 45) degrees for full 10 secs  5b. Motor Arm, Right: 0-->No drift, limb holds 90 (or 45) degrees for full 10 secs  6a. Motor Leg, Left: 0-->No drift, leg holds 30 degree position for full 5 secs  6b. Motor Leg, Right: 0-->No drift, leg holds 30 degree position for full 5 secs  7. Limb Ataxia: 0-->Absent  8. Sensory: 0-->Normal, no sensory loss  9. Best Language: 0-->No aphasia, normal  10. Dysarthria: 0-->Normal  11. Extinction and Inattention (formerly Neglect): 0-->No abnormality    Total (NIH Stroke Scale): 0     Dysphagia screening results:  Patient Factors Component (Dysphagia:Stroke or Rule-out)  Best Eye Response: 4-->(E4) spontaneous (07/01/25 1434)  Best Motor Response: 6-->(M6) obeys commands (07/01/25 1434)  Best Verbal Response: 5-->(V5) oriented (07/01/25 1434)  [place] Coma Scale Score: 15 (07/01/25 1434)  Is there Facial Asymmetry/Weakness?: No (07/01/25 1434)  Is there Tongue Asymmetry/Weakness?: No (07/01/25 1434)  Is there Palatal Asymmetry/Weakness?: No (07/01/25 1434)  Patient Assessment Result: Pass - Proceed to Water Test (07/01/25 1434)     [place] Coma Scale:  No data recorded     CIWA:        Restraint Type:            Isolation Status:  No active isolations

## 2025-07-01 NOTE — TELEPHONE ENCOUNTER
Caller: Carina MCCOLLUM    Relationship to patient: Self    Best call back number: [phone]     Chief complaint: PASSING OUT (REPORTS PREVIOUSLY DIAGNOSED WITH SYNCOPE, BUT PASSING OUT IS GETTING WORSE) NUMBING AND WEAKNESS OF LEFT LEG     Patient directed to call 911 or go to their nearest emergency room.     Patient verbalized understanding: [x] Yes  [] No  If no, why?    Additional notes:THE PATIENT ADVISED SHE WOULD GO TO THE ER.

## 2025-07-02 ENCOUNTER — READMISSION MANAGEMENT (OUTPATIENT)
Dept: CALL CENTER | Facility: HOSPITAL | Age: 40
End: 2025-07-02
Payer: COMMERCIAL

## 2025-07-02 ENCOUNTER — APPOINTMENT (OUTPATIENT)
Dept: CARDIOLOGY | Facility: HOSPITAL | Age: 40
End: 2025-07-02
Payer: COMMERCIAL

## 2025-07-02 VITALS
TEMPERATURE: 98.5 F | RESPIRATION RATE: 14 BRPM | HEART RATE: 74 BPM | WEIGHT: 115.08 LBS | SYSTOLIC BLOOD PRESSURE: 141 MMHG | BODY MASS INDEX: 19.17 KG/M2 | OXYGEN SATURATION: 98 % | HEIGHT: 65 IN | DIASTOLIC BLOOD PRESSURE: 99 MMHG

## 2025-07-02 LAB
ANION GAP SERPL CALCULATED.3IONS-SCNC: 8.3 MMOL/L (ref 5–15)
AORTIC DIMENSIONLESS INDEX: 0.7 (DI)
ASCENDING AORTA: 2.4 CM
AV MEAN PRESS GRAD SYS DOP V1V2: 4 MMHG
AV VMAX SYS DOP: 133 CM/SEC
BH CV ECHO MEAS - AO MAX PG: 7.1 MMHG
BH CV ECHO MEAS - AO ROOT DIAM: 3.1 CM
BH CV ECHO MEAS - AO V2 VTI: 26.4 CM
BH CV ECHO MEAS - AVA(I,D): 2.2 CM2
BH CV ECHO MEAS - EDV(CUBED): 85.2 ML
BH CV ECHO MEAS - EDV(MOD-SP2): 64.1 ML
BH CV ECHO MEAS - EDV(MOD-SP4): 76.6 ML
BH CV ECHO MEAS - EF(MOD-SP2): 61.8 %
BH CV ECHO MEAS - EF(MOD-SP4): 69.2 %
BH CV ECHO MEAS - ESV(CUBED): 39.3 ML
BH CV ECHO MEAS - ESV(MOD-SP2): 24.5 ML
BH CV ECHO MEAS - ESV(MOD-SP4): 23.6 ML
BH CV ECHO MEAS - FS: 22.7 %
BH CV ECHO MEAS - IVS/LVPW: 1 CM
BH CV ECHO MEAS - IVSD: 0.8 CM
BH CV ECHO MEAS - LA DIMENSION: 2.6 CM
BH CV ECHO MEAS - LAT PEAK E' VEL: 15.9 CM/SEC
BH CV ECHO MEAS - LV DIASTOLIC VOL/BSA (35-75): 49 CM2
BH CV ECHO MEAS - LV MASS(C)D: 109.4 GRAMS
BH CV ECHO MEAS - LV MAX PG: 3.6 MMHG
BH CV ECHO MEAS - LV MEAN PG: 2 MMHG
BH CV ECHO MEAS - LV SYSTOLIC VOL/BSA (12-30): 15.1 CM2
BH CV ECHO MEAS - LV V1 MAX: 94.4 CM/SEC
BH CV ECHO MEAS - LV V1 VTI: 18.5 CM
BH CV ECHO MEAS - LVIDD: 4.4 CM
BH CV ECHO MEAS - LVIDS: 3.4 CM
BH CV ECHO MEAS - LVOT AREA: 3.1 CM2
BH CV ECHO MEAS - LVOT DIAM: 2 CM
BH CV ECHO MEAS - LVPWD: 0.8 CM
BH CV ECHO MEAS - MED PEAK E' VEL: 10 CM/SEC
BH CV ECHO MEAS - MV A MAX VEL: 82.1 CM/SEC
BH CV ECHO MEAS - MV DEC SLOPE: 264 CM/SEC2
BH CV ECHO MEAS - MV DEC TIME: 0.23 SEC
BH CV ECHO MEAS - MV E MAX VEL: 83.9 CM/SEC
BH CV ECHO MEAS - MV E/A: 1.02
BH CV ECHO MEAS - MV MAX PG: 2.41 MMHG
BH CV ECHO MEAS - MV MEAN PG: 1 MMHG
BH CV ECHO MEAS - MV P1/2T: 87.5 MSEC
BH CV ECHO MEAS - MV V2 VTI: 24.5 CM
BH CV ECHO MEAS - MVA(P1/2T): 2.5 CM2
BH CV ECHO MEAS - MVA(VTI): 2.37 CM2
BH CV ECHO MEAS - PA ACC TIME: 0.14 SEC
BH CV ECHO MEAS - PA V2 MAX: 89.2 CM/SEC
BH CV ECHO MEAS - SV(LVOT): 58 ML
BH CV ECHO MEAS - SV(MOD-SP2): 39.6 ML
BH CV ECHO MEAS - SV(MOD-SP4): 53 ML
BH CV ECHO MEAS - SVI(LVOT): 37.1 ML/M2
BH CV ECHO MEAS - SVI(MOD-SP2): 25.3 ML/M2
BH CV ECHO MEAS - SVI(MOD-SP4): 33.9 ML/M2
BH CV ECHO MEAS - TAPSE (>1.6): 2.8 CM
BH CV ECHO MEASUREMENTS AVERAGE E/E' RATIO: 6.48
BH CV ECHO SHUNT ASSESSMENT PERFORMED (HIDDEN SCRIPTING): 1
BH CV VAS BP RIGHT ARM: NORMAL MMHG
BH CV XLRA - RV BASE: 2.3 CM
BH CV XLRA - RV LENGTH: 7 CM
BH CV XLRA - RV MID: 1.4 CM
BH CV XLRA - TDI S': 12.6 CM/SEC
BUN SERPL-MCNC: 6.2 MG/DL (ref 6–20)
BUN/CREAT SERPL: 12.2 (ref 7–25)
CALCIUM SPEC-SCNC: 8.4 MG/DL (ref 8.6–10.5)
CHLORIDE SERPL-SCNC: 108 MMOL/L (ref 98–107)
CHOLEST SERPL-MCNC: 120 MG/DL (ref 0–200)
CO2 SERPL-SCNC: 20.7 MMOL/L (ref 22–29)
CREAT SERPL-MCNC: 0.51 MG/DL (ref 0.57–1)
EGFRCR SERPLBLD CKD-EPI 2021: 120.4 ML/MIN/1.73
GLUCOSE SERPL-MCNC: 99 MG/DL (ref 65–99)
HBA1C MFR BLD: 5.47 % (ref 4.8–5.6)
HDLC SERPL-MCNC: 22 MG/DL (ref 40–60)
IVRT: 92 MS
LDLC SERPL CALC-MCNC: 75 MG/DL (ref 0–100)
LDLC/HDLC SERPL: 3.28 {RATIO}
LEFT ATRIUM VOLUME INDEX: 18.8 ML/M2
LV EF BIPLANE MOD: 64.6 %
MAGNESIUM SERPL-MCNC: 2 MG/DL (ref 1.6–2.6)
POTASSIUM SERPL-SCNC: 4.2 MMOL/L (ref 3.5–5.2)
SODIUM SERPL-SCNC: 137 MMOL/L (ref 136–145)
TRIGL SERPL-MCNC: 129 MG/DL (ref 0–150)
VLDLC SERPL-MCNC: 23 MG/DL (ref 5–40)

## 2025-07-02 PROCEDURE — 80061 LIPID PANEL: CPT

## 2025-07-02 PROCEDURE — 83036 HEMOGLOBIN GLYCOSYLATED A1C: CPT

## 2025-07-02 PROCEDURE — 99214 OFFICE O/P EST MOD 30 MIN: CPT | Performed by: STUDENT IN AN ORGANIZED HEALTH CARE EDUCATION/TRAINING PROGRAM

## 2025-07-02 PROCEDURE — 83735 ASSAY OF MAGNESIUM: CPT | Performed by: INTERNAL MEDICINE

## 2025-07-02 PROCEDURE — 25010000002 METOCLOPRAMIDE PER 10 MG: Performed by: INTERNAL MEDICINE

## 2025-07-02 PROCEDURE — 80048 BASIC METABOLIC PNL TOTAL CA: CPT | Performed by: INTERNAL MEDICINE

## 2025-07-02 PROCEDURE — G0378 HOSPITAL OBSERVATION PER HR: HCPCS

## 2025-07-02 PROCEDURE — 99239 HOSP IP/OBS DSCHRG MGMT >30: CPT | Performed by: INTERNAL MEDICINE

## 2025-07-02 PROCEDURE — 96376 TX/PRO/DX INJ SAME DRUG ADON: CPT

## 2025-07-02 PROCEDURE — 97161 PT EVAL LOW COMPLEX 20 MIN: CPT

## 2025-07-02 PROCEDURE — 92523 SPEECH SOUND LANG COMPREHEN: CPT

## 2025-07-02 PROCEDURE — 93306 TTE W/DOPPLER COMPLETE: CPT

## 2025-07-02 PROCEDURE — 97165 OT EVAL LOW COMPLEX 30 MIN: CPT

## 2025-07-02 PROCEDURE — 93306 TTE W/DOPPLER COMPLETE: CPT | Performed by: INTERNAL MEDICINE

## 2025-07-02 RX ORDER — ATORVASTATIN CALCIUM 40 MG/1
40 TABLET, FILM COATED ORAL NIGHTLY
Qty: 90 TABLET | Refills: 0 | Status: SHIPPED | OUTPATIENT
Start: 2025-07-02

## 2025-07-02 RX ORDER — LISINOPRIL 20 MG/1
20 TABLET ORAL
Qty: 30 TABLET | Refills: 0 | Status: SHIPPED | OUTPATIENT
Start: 2025-07-02 | End: 2025-07-02

## 2025-07-02 RX ORDER — ASPIRIN 81 MG/1
81 TABLET, CHEWABLE ORAL DAILY
Qty: 30 TABLET | Refills: 0 | Status: SHIPPED | OUTPATIENT
Start: 2025-07-03 | End: 2025-07-02

## 2025-07-02 RX ORDER — ASPIRIN 81 MG/1
81 TABLET, CHEWABLE ORAL DAILY
Qty: 30 TABLET | Refills: 0 | Status: SHIPPED | OUTPATIENT
Start: 2025-07-03

## 2025-07-02 RX ORDER — NICOTINE 21 MG/24HR
1 PATCH, TRANSDERMAL 24 HOURS TRANSDERMAL EVERY 24 HOURS
Qty: 28 PATCH | Refills: 0 | Status: SHIPPED | OUTPATIENT
Start: 2025-07-02 | End: 2025-07-02

## 2025-07-02 RX ORDER — LISINOPRIL 20 MG/1
20 TABLET ORAL
Qty: 30 TABLET | Refills: 0 | Status: SHIPPED | OUTPATIENT
Start: 2025-07-02

## 2025-07-02 RX ORDER — LISINOPRIL 20 MG/1
20 TABLET ORAL
Status: DISCONTINUED | OUTPATIENT
Start: 2025-07-02 | End: 2025-07-02 | Stop reason: HOSPADM

## 2025-07-02 RX ORDER — ATORVASTATIN CALCIUM 40 MG/1
40 TABLET, FILM COATED ORAL NIGHTLY
Qty: 90 TABLET | Refills: 0 | Status: SHIPPED | OUTPATIENT
Start: 2025-07-02 | End: 2025-07-02

## 2025-07-02 RX ORDER — NICOTINE 21 MG/24HR
1 PATCH, TRANSDERMAL 24 HOURS TRANSDERMAL EVERY 24 HOURS
Qty: 28 PATCH | Refills: 0 | Status: SHIPPED | OUTPATIENT
Start: 2025-07-02

## 2025-07-02 RX ADMIN — METOCLOPRAMIDE 10 MG: 5 INJECTION, SOLUTION INTRAMUSCULAR; INTRAVENOUS at 05:37

## 2025-07-02 RX ADMIN — ASPIRIN 81 MG: 81 TABLET, CHEWABLE ORAL at 09:03

## 2025-07-02 RX ADMIN — LISINOPRIL 20 MG: 20 TABLET ORAL at 11:48

## 2025-07-02 RX ADMIN — CLOPIDOGREL BISULFATE 75 MG: 75 TABLET, FILM COATED ORAL at 09:03

## 2025-07-02 RX ADMIN — Medication 10 ML: at 09:03

## 2025-07-02 NOTE — THERAPY DISCHARGE NOTE
Patient Name: Carina MCCOLLUM  : 1985    MRN: [id number]                              Today's Date: 2025       Admit Date: 2025    Visit Dx:     ICD-10-CM ICD-9-CM   1. Left sided numbness  R20.0 782.0   2. Recurrent headache  R51.9 784.0   3. Hypertension, unspecified type  I10 401.9     Patient Active Problem List   Diagnosis    Left-sided weakness    Essential hypertension     History reviewed. No pertinent past medical history.  Past Surgical History:   Procedure Laterality Date    ESSURE TUBAL LIGATION Bilateral       General Information       Row Name 25 0955          Physical Therapy Time and Intention    Document Type discharge evaluation/summary  -CK     Mode of Treatment physical therapy;individual therapy  -CK       Row Name 25 0955          General Information    Patient Profile Reviewed yes  -CK     Prior Level of Function independent:;all household mobility;ADL's;home management;driving;work  ind no AD, recently diagnosed with syncope, works at [employer] in housekeeping, [place]  -CK     Barriers to Rehab none identified  -CK       Row Name 25 0955          Living Environment    Current Living Arrangements home  -CK     People in Home child(min), dependent;spouse  -CK       Row Name 25 0955          Home Main Entrance    Number of Stairs, Main Entrance one  -CK     Stair Railings, Main Entrance none  -CK       Row Name 25 0955          Stairs Within Home, Primary    Number of Stairs, Within Home, Primary none  -CK       Row Name 25 0955          Cognition    Orientation Status (Cognition) oriented x 4  -CK       Row Name 25 0955          Safety Issues/Impairments Affecting Functional Mobility    Comment, Safety Issues/Impairments (Mobility) no safety issues or impairments identified  -CK               User Key  (r) = Recorded By, (t) = Taken By, (c) = Cosigned By      Initials Name Provider Type    CK  Pattie Farley, PUNEET Physical Therapist                   Mobility       Row Name 07/02/25 0956          Bed Mobility    Comment, (Bed Mobility) [facility] pre/post eval  -CK       Row Name 07/02/25 0956          Sit-Stand Transfer    Sit-Stand [place] (Transfers) standby assist  -CK       Row Name 07/02/25 0956          Gait/Stairs (Locomotion)    [place] Level (Gait) standby assist  -CK     Patient was able to Ambulate yes  -CK     Distance in Feet (Gait) 300  -CK     [place] Level (Stairs) stand by assist  -CK     Handrail Location (Stairs) left side (ascending);right side (descending)  -CK     Number of Steps (Stairs) 10  -CK     Ascending Technique (Stairs) step-over-step  -CK     Descending Technique (Stairs) step-over-step  -CK     Comment, (Gait/Stairs) Patient ambulated in burns with step through gait pattern and good gait mechanics. She was able to perform head turns L/R and up/down without LOB or dizziness and demonstrate good ability to pick object up from floor without difficulty. She navigated 10 steps without difficulty using unilateral handrail. No LOB, buckling, or symptoms of lightheadedness.  -CK               User Key  (r) = Recorded By, (t) = Taken By, (c) = Cosigned By      Initials Name Provider Type    CK Pattie Farley PT Physical Therapist                   Obj/Interventions       Row Name 07/02/25 0958          Range of Motion Comprehensive    General Range of Motion bilateral lower extremity ROM WFL  -CK       Row Name 07/02/25 0958          Strength Comprehensive (MMT)    General Manual Muscle Testing (MMT) Assessment lower extremity strength deficits identified  -CK     Comment, General Manual Muscle Testing (MMT) Assessment BLE grossly symmetrical hips 4-/5, knees/ankles 4+/5  -CK       Row Name 07/02/25 0958          Motor Skills    Motor Skills coordination  -CK     Coordination bilateral;lower extremity;WFL;heel to shin  -CK       Row Name 07/02/25 0958           Balance    Balance Assessment sitting static balance;standing static balance;standing dynamic balance;sitting dynamic balance  -CK     Static Sitting Balance independent  -CK     Dynamic Sitting Balance independent  -CK     Position, Sitting Balance unsupported;sitting in chair  -CK     Static Standing Balance supervision  -CK     Dynamic Standing Balance standby assist  -CK     Position/Device Used, Standing Balance unsupported  -CK     Comment, Balance no LOB or buckling  -CK       Row Name 07/02/25 0958          Sensory Assessment (Somatosensory)    Sensory Assessment (Somatosensory) LE sensation intact  -CK               User Key  (r) = Recorded By, (t) = Taken By, (c) = Cosigned By      Initials Name Provider Type    CK Pattie Farley, PT Physical Therapist                   Goals/Plan    No documentation.                  Clinical Impression       Row Name 07/02/25 0959          Pain    Pretreatment Pain Rating 0/10 - no pain  -CK     Posttreatment Pain Rating 0/10 - no pain  -CK       Row Name 07/02/25 0959          Plan of Care Review    Plan of Care Reviewed With patient  -CK     Outcome Evaluation Patient presents at her functional baseline with ability to ambulate 300' SBA unsupported and perform dynamic gait tasks without difficulty. No IPPT needs identified. Will sign off and recommend D/C home when medically appropriate.  -CK       Row Name 07/02/25 0959          Therapy Assessment/Plan (PT)    Patient/Family Therapy Goals Statement (PT) go home  -CK     Criteria for Skilled Interventions Met (PT) no;no problems identified which require skilled intervention  -CK     Therapy Frequency (PT) evaluation only  -CK       Row Name 07/02/25 0959          Vital Signs    Pre Systolic BP Rehab 163  -CK     Pre Treatment Diastolic BP 91  -CK     Post Systolic BP Rehab 170  -CK     Post Treatment Diastolic BP 92  -CK     Pretreatment Heart Rate (beats/min) 67  -CK     Intratreatment Heart Rate (beats/min) 64   -CK     Posttreatment Heart Rate (beats/min) 65  -CK     Pre SpO2 (%) 99  -CK     O2 Delivery Pre Treatment room air  -CK     Intra SpO2 (%) 99  -CK     O2 Delivery Intra Treatment room air  -CK     Post SpO2 (%) 97  -CK     O2 Delivery Post Treatment room air  -CK     Pre Patient Position Sitting  -CK     Post Patient Position Sitting  -CK       Row Name 07/02/25 0959          Positioning and Restraints    Pre-Treatment Position sitting in chair/recliner  -CK     Post Treatment Position chair  -CK     In Chair reclined;call light within reach;encouraged to call for assist;exit alarm on;with family/caregiver;waffle cushion;notified nsg  -CK               User Key  (r) = Recorded By, (t) = Taken By, (c) = Cosigned By      Initials Name Provider Type    CK Pattie Farley, PT Physical Therapist                   Outcome Measures       Row Name 07/02/25 1002          How much help from another person do you currently need...    Turning from your back to your side while in flat bed without using bedrails? 4  -CK     Moving from lying on back to sitting on the side of a flat bed without bedrails? 4  -CK     Moving to and from a bed to a chair (including a wheelchair)? 4  -CK     Standing up from a chair using your arms (e.g., wheelchair, bedside chair)? 4  -CK     Climbing 3-5 steps with a railing? 4  -CK     To walk in hospital room? 4  -CK     AM-PAC 6 Clicks Score (PT) 24  -CK     Highest Level of Mobility Goal Walk 250 Feet or More - 8  -CK       Row Name 07/02/25 1002 07/02/25 0836       Modified [place] Scale    Pre-Stroke Modified [place] Scale 6 - Unable to determine (UTD) from the medical record documentation  -CK 6 - Unable to determine (UTD) from the medical record documentation  -AJ    Modified Rashid Scale 0 - No Symptoms at all.  -CK 0 - No Symptoms at all.  -AJ      Row Name 07/02/25 1002 07/02/25 0836       Functional Assessment    Outcome Measure Options AM-PAC 6 Clicks Basic Mobility (PT);Modified  Rashid KAUR AM-PAC 6 Clicks Daily Activity (OT);Modified Rashid MORGAN              User Key  (r) = Recorded By, (t) = Taken By, (c) = Cosigned By      Initials Name Provider Type    CK Pattie Farley, PT Physical Therapist    Layla Avina, OT Occupational Therapist                  Physical Therapy Education       Title: PT OT SLP Therapies (In Progress)       Topic: Physical Therapy (Done)       Point: Mobility training (Done)       Learning Progress Summary            Patient Acceptance, E, VU by CK at 7/2/2025 1003                      Point: Home exercise program (Done)       Learning Progress Summary            Patient Acceptance, E, VU by CK at 7/2/2025 1003                      Point: Body mechanics (Done)       Learning Progress Summary            Patient Acceptance, E, VU by CK at 7/2/2025 1003                      Point: Precautions (Done)       Learning Progress Summary            Patient Acceptance, E, VU by  at 7/2/2025 1003                                      User Key       Initials Effective Dates Name Provider Type [facility] 02/06/24 -  Pattie Farley, PT Physical Therapist PT                  PT Recommendation and Plan     Outcome Evaluation: Patient presents at her functional baseline with ability to ambulate 300' SBA unsupported and perform dynamic gait tasks without difficulty. No IPPT needs identified. Will sign off and recommend D/C home when medically appropriate.     Time Calculation:   PT Evaluation Complexity  History, PT Evaluation Complexity: 1-2 personal factors and/or comorbidities  Examination of Body Systems (PT Eval Complexity): total of 3 or more elements  Clinical Presentation (PT Evaluation Complexity): stable  Clinical Decision Making (PT Evaluation Complexity): low complexity  Overall Complexity (PT Evaluation Complexity): low complexity     PT Charges       Row Name 07/02/25 1003             Time Calculation    Start Time 0929  -      PT Received On  07/02/25  -CK         Untimed Charges    PT Eval/Re-eval Minutes 40  -CK         Total Minutes    Untimed Charges Total Minutes 40  -CK       Total Minutes 40  -CK                User Key  (r) = Recorded By, (t) = Taken By, (c) = Cosigned By      Initials Name Provider Type    CK Pattie Farley, PT Physical Therapist                  Therapy Charges for Today       Code Description Service Date Service Provider Modifiers Qty    [id number] HC PT EVAL LOW COMPLEXITY 3 7/2/2025 Pattie Farley, PT GP 1            PT G-Codes  Outcome Measure Options: AM-PAC 6 Clicks Basic Mobility (PT), Modified [place]  AM-PAC 6 Clicks Score (PT): 24  AM-PAC 6 Clicks Score (OT): 24  Modified [place] Scale: 0 - No Symptoms at all.    PT Discharge Summary  Anticipated Discharge Disposition (PT): home with assist    Pattie Farley, PUNEET  7/2/2025

## 2025-07-02 NOTE — DISCHARGE SUMMARY
[facility] Medicine Services  DISCHARGE SUMMARY    Patient Name: Carina MCCOLLUM  : 1985  MRN: [id number]    Date of Admission: 2025 12:49 PM  Date of Discharge:  2025  Primary Care Physician: Ambar Ornelas PA-C    Consults       Date and Time Order Name Status Description    2025 12:59 PM Inpatient Neurology Consult Stroke              Hospital Course     Presenting Problem: Left sided weakness     Active Hospital Problems    Diagnosis  POA    **Left-sided weakness [R53.1]  Yes    Essential hypertension [I10]  Yes      Resolved Hospital Problems   No resolved problems to display.          Hospital Course:  Carina MCCOLLUM is a 40 y.o. female with PMHx significant for tobacco abuse, migraines who presented to [facility] as a code stroke for left sided weakness/numbness.      Left Sided Weakness  - TIA vs. Possible seizure vs. 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 Will need to trend BP at home and follow up with PCP for further adjustments to meds     Tobacco Abuse:  - nicotine patch  -- encouraged cessation       Discharge Follow Up Recommendations for outpatient labs/diagnostics:   Follow up with PCP within one week  Day of Discharge     HPI:   Doing well this am, no new issues overnight. Wants to go home.      Review of Systems  Gen- No fevers, chills  CV- No chest pain, palpitations  Resp- No cough, dyspnea  GI- No N/V/D, abd pain     Vital Signs:    Temp:  [98.3 °F (36.8 °C)-98.9 °F (37.2 °C)] 98.4 °F (36.9 °C)  Heart Rate:  [58-82] 68  Resp:  [16-18] 16  BP: (154-196)/() 171/83      Physical Exam:  Constitutional: No acute distress, awake, alert  HENT: NCAT, mucous membranes moist  Respiratory: Clear to auscultation bilaterally, respiratory effort normal   Cardiovascular: RRR, no murmurs, rubs, or gallops  Gastrointestinal: Positive bowel sounds, soft, nontender, nondistended  Musculoskeletal: No bilateral ankle edema  Psychiatric: Appropriate affect, cooperative  Neurologic: Oriented x 3, strength symmetric in all extremities, Cranial Nerves grossly intact to confrontation, speech clear  Skin: No rashes     Pertinent  and/or Most Recent Results     LAB RESULTS:      Lab 07/01/25  1310 07/01/25  1309   WBC  --  10.21   HEMOGLOBIN  --  12.5   HEMOGLOBIN, POC 14.3  --    HEMATOCRIT  --  39.1   HEMATOCRIT POC 42  --    PLATELETS  --  397   NEUTROS ABS  --  7.18*   IMMATURE GRANS (ABS)  --  0.05   LYMPHS ABS  --  2.25   MONOS ABS  --  0.61   EOS ABS  --  0.06   MCV  --  80.1   PROTIME  --  13.6   APTT  --  27.4         Lab 07/01/25  1310 07/01/25  1309   SODIUM  --  139   POTASSIUM  --  3.7   CHLORIDE  --  105   CO2  --  22.6   ANION GAP  --  11.4   BUN  --  3.8*   CREATININE 0.50* 0.56*   EGFR 121.0 117.8   GLUCOSE  --  111*   CALCIUM  --  9.0         Lab 07/01/25  1309   TOTAL PROTEIN 7.4   ALBUMIN 4.5   GLOBULIN 2.9   ALT (SGPT) 8   AST (SGOT) 20   BILIRUBIN 0.4   ALK PHOS 87         Lab 07/01/25  1431 07/01/25  1309   PROBNP  --  85.8   HSTROP T 11 <6   PROTIME  --  13.6   INR  --  0.99                 Brief Urine Lab Results  (Last result in the past 365 days)        Color   Clarity   Blood   Leuk Est   Nitrite   Protein   CREAT   Urine HCG        07/01/25 1327               Negative             Microbiology Results (last 10 days)       ** No results found for the last 240 hours. **            EEG  Result Date: 7/1/2025  Reason for referral: 40  "y.o.female with altered mental status, episodes of jerking, consideration of seizures Technical Summary:  A 19 channel digital EEG was performed using the international 10-20 placement system, including eye leads and EKG leads. Duration: 21 minutes Findings: The patient is awake.  Diffuse medium amplitude intermixed theta and alpha activity is present along with an overlay of EMG artifact.  A 9 Hz posterior rhythm is evident over the occipital leads.  As a study proceeds, at 1 point the patient feels like she is \"drunk\", but there is no change in the background.  Hyperventilation does not change the background.  Photic stimulation does not elicit abnormality.  Stage II sleep is not seen.  No focal features or epileptiform activity are present. Video: Available Technical quality: Good Rhythm strip: Regular, 60 bpm SUMMARY: Normal EEG in the awake state No focal features or epileptiform activity are seen     Normal study This report is transcribed using the Dragon dictation system.      XR Chest 1 View  Result Date: 7/1/2025  XR CHEST 1 VW Date of Exam: 7/1/2025 4:04 PM EDT Indication: Acute Stroke Protocol (onset < 12 hrs) Comparison: None available. Findings: Heart size and pulmonary vasculature within normal limits. Lungs clear. Costophrenic angle sharp     Impression: No active cardiopulmonary disease Electronically Signed: Bryon Escamilla  7/1/2025 4:36 PM EDT  Workstation ID: [id number]    MRI Brain Without Contrast  Result Date: 7/1/2025  MRI BRAIN WO CONTRAST Date of Exam: 7/1/2025 1:48 PM EDT Indication: left sided weakness/ numbness.  Comparison: CT head from earlier today Technique:  Routine multiplanar/multisequence sequence images of the brain were obtained without contrast administration. Findings: No acute infarction, intracranial hemorrhage, or extra-axial collection is identified. The ventricles appear normal in caliber, with no evidence of mass effect or midline shift. The basal cisterns appear patent. " The midline structures appear intact. The globes and orbits appear intact. The intracranial vascular flow-voids appear patent. A couple subcortical white matter FLAIR hyperintensities in the right frontal lobe are nonspecific. Both hippocampi appear within normal limits. There is mucosal disease in the right maxillary sinus.     Impression: 1.No acute intracranial process identified. 2.Mucosal disease within right maxillary sinus. Electronically Signed: Sanjay Childs MD  7/1/2025 2:21 PM EDT  Workstation ID: [id number]    CT Angiogram Head w AI Analysis of LVO  Result Date: 7/1/2025  CT ANGIOGRAM HEAD W AI ANALYSIS OF LVO Date of Exam: 7/1/2025 1:05 PM EDT Indication: Neuro Deficit, acute, Stroke suspected Neuro deficit, acute stroke suspected. Comparison: Noncontrast head CT and CT perfusion from today Technique: CTA of the head was performed after the uneventful intravenous administration of 125 mL Isovue-370. Reconstructed coronal and sagittal images were also obtained. In addition, a 3-D volume rendered image was created for interpretation. Automated exposure control and iterative reconstruction methods were used. FINDINGS: The visualized intracranial portions of the internal carotid arteries as well as the middle cerebral, anterior cerebral, posterior cerebral, basilar, and vertebral arteries appear patent without significant stenosis. No intracranial aneurysm is seen. No suspicious contrast enhancement is identified. Please refer to the accompanying head CT for description of nonvascular intracranial findings.     No intracranial large vessel occlusion is identified. Electronically Signed: Yair Aldana MD  7/1/2025 1:58 PM EDT  Workstation ID: [id number]    CT Angiogram Neck  Result Date: 7/1/2025  CT ANGIOGRAM NECK Date of Exam: 7/1/2025 1:05 PM EDT Indication: Neuro Deficit, acute, Stroke suspected Neuro deficit, acute stroke suspected. Comparison: None available. Technique: CTA of the neck was performed  before and after the uneventful intravenous administration of 80 mL Isovue-370. Reconstructed coronal and sagittal images were also obtained. In addition, a 3-D volume rendered image was created for interpretation. Automated exposure control and iterative reconstruction methods were used. Findings: The lung apices are clear. The neck soft tissues demonstrate no pathologic cervical lymphadenopathy or aerodigestive tract mass. The osseous structures demonstrate no evidence of acute fracture or aggressive osseous lesion. Patent aortic arch with three-vessel branching. The subclavian arteries are patent bilaterally. There is 0% narrowing of the right and left ICA origins by NASCET criteria. There is a tiny focal linear luminal abnormality present at the left ICA origin, along the posterior wall,  favoring incidental carotid web but at least somewhat concerning for possible small dissection without evidence of associated luminal narrowing or thrombus formation. The vertebral arteries are normal in course and caliber bilaterally in the neck.     Impression: CT angiogram of the neck demonstrates no evidence of high-grade stenosis, large vessel occlusion or aneurysm. Small focal linear luminal abnormality seen along the posterior wall of the left ICA origin, favoring a carotid web, with short segment dissection an additional consideration. There is no evidence of associated thrombus formation or luminal narrowing. Electronically Signed: Dajuan Robles MD  7/1/2025 1:37 PM EDT  Workstation ID: [id number]    CT CEREBRAL PERFUSION WITH & WITHOUT CONTRAST  Result Date: 7/1/2025  CT CEREBRAL PERFUSION W WO CONTRAST Date of Exam: 7/1/2025 1:05 PM EDT Indication: Neuro Deficit, acute, Stroke suspected Neuro deficit, acute stroke suspected.  Comparison: Same day CT head Technique: Axial CT images of the brain were obtained prior to and after the administration of 125 mL Isovue-370. Core blood volume, core blood flow, mean transit  time, and Tmax images were obtained utilizing the Rapid software protocol. A limited CT angiogram of the head was also performed to measure the blood vessel density. The radiation dose reduction device was turned on for each scan per the ALARA (As Low as Reasonably Achievable) protocol. Findings: There appears to be right greater than left anterior circulation diminished CBF suggestive of infarct (volume 41 mL). There is a large area of right greater than left elevated Tmax (volume 81 mL). The mismatch volume is 40 mL with mismatch ratio of 2. When compared to same day CTA, this does not appear to correlate findings on preliminary review of the CTA and these findings may be secondary to suboptimal bolus timing..     Impression: 1.Right greater than left anterior circulation diminished CBF suggestive of infarct. 2.There is a large area of right greater than left elevated Tmax (volume 81 mL) with mismatch volume 40 mL. 3.When compared to same day CTA, this does not appear to correlate to findings on preliminary review of the CTA and these findings may be secondary to suboptimal bolus timing. Findings discussed with stroke team by Dr. Kenneth Anne via telephone on 7/1/2025 1:32 PM EDT. Electronically Signed: Kenneth Anne MD  7/1/2025 1:32 PM EDT  Workstation ID: [id number]    CT Head Without Contrast Stroke Protocol  Result Date: 7/1/2025  CT HEAD WO CONTRAST STROKE PROTOCOL Date of Exam: 7/1/2025 12:58 PM EDT Indication: Neuro deficit, acute, stroke suspected Neuro Deficit, acute, Stroke suspected. Comparison: None available. Technique: Axial CT images were obtained of the head without contrast administration.  Reconstructed coronal images were also obtained. Automated exposure control and iterative construction methods were used. Scan Time: 12:59 p.m. Results discussed with stroke team at 1:09 p.m. Findings: Parenchyma:No acute intraparenchymal hemorrhage. No loss of gray-white differentiation to suggest large  territory infarct. Normal parenchymal volume. No substantial white matter disease. No midline shift or herniation. Ventricles and extra axial spaces:Normal caliber of ventricles and sulci. No extra axial fluid collection seen. Other:Orbits are grossly intact. Paranasal sinuses are clear. Mastoid air cells are clear. Calvarium is intact. No substantial intracranial atherosclerotic calcification.     Impression: No evidence of acute intracranial hemorrhage or large territory infarct. Electronically Signed: Kenneth Anne MD  7/1/2025 1:10 PM EDT  Workstation ID: [id number]                  Plan for Follow-up of Pending Labs/Results:     Discharge Details        Discharge Medications        New Medications        Instructions Start Date   aspirin 81 MG chewable tablet   81 mg, Oral, Daily   Start Date: July 3, 2025     atorvastatin 40 MG tablet  Commonly known as: LIPITOR   40 mg, Oral, Nightly      lisinopril 20 MG tablet  Commonly known as: PRINIVIL,ZESTRIL   20 mg, Oral, Every 24 Hours Scheduled      nicotine 21 MG/24HR patch  Commonly known as: NICODERM CQ   1 patch, Transdermal, Every 24 Hours               No Known Allergies      Discharge Disposition:      Diet:  Hospital:  Diet Order   Procedures    Diet: Regular/House; Fluid Consistency: Thin (IDDSI 0)            Activity:      Restrictions or Other Recommendations:         CODE STATUS:    Code Status and Medical Interventions: CPR (Attempt to Resuscitate); Full Support   Ordered at: 07/01/25 1441     Code Status (Patient has no pulse and is not breathing):    CPR (Attempt to Resuscitate)     Medical Interventions (Patient has pulse or is breathing):    Full Support     Level Of Support Discussed With:    Patient       No future appointments.              Aurelia Molina MD  07/02/25      Time Spent on Discharge:  I spent  35  minutes on this discharge activity which included: face-to-face encounter with the patient, reviewing the data in the system,  coordination of the care with the nursing staff as well as consultants, documentation, and entering orders.

## 2025-07-02 NOTE — OUTREACH NOTE
Prep Survey      Flowsheet Row Responses   [place] facility patient discharged from? [place]   Is LACE score < 7 ? Yes   Eligibility [facility]   Date of Admission 07/01/25   Date of Discharge 07/02/25   Discharge Disposition Home or Self Care   Discharge diagnosis Left sided weakness   Does the patient have one of the following disease processes/diagnoses(primary or secondary)? Other   Does the patient have Home health ordered? No   Is there a DME ordered? No   Prep survey completed? Yes            CONSTANCE LERMA - Registered Nurse

## 2025-07-02 NOTE — PLAN OF CARE
Goal Outcome Evaluation:  Plan of Care Reviewed With: patient, spouse                Anticipated Discharge Disposition (SLP): No further SLP services warranted    SLP Diagnosis: functional speech/language skills, functional cognitive-linguistic skills (07/02/25 1501)  SLP Diagnosis Comments: Cognitive-linguistic skills seemingly WFL in informal conversation, which included discussion of diagnosis and planning for discharge. Pt declined further comprehensive cognitive assessment. Pt/spouse denied noting acute deficits. (07/02/25 1501)

## 2025-07-02 NOTE — THERAPY DISCHARGE NOTE
Acute Care - Occupational Therapy Discharge  [facility]    Patient Name: Carina MCCOLLUM  : 1985    MRN: [id number]                              Today's Date: 2025       Admit Date: 2025    Visit Dx:     ICD-10-CM ICD-9-CM   1. Left sided numbness  R20.0 782.0   2. Recurrent headache  R51.9 784.0   3. Hypertension, unspecified type  I10 401.9     Patient Active Problem List   Diagnosis    Left-sided weakness    Essential hypertension     History reviewed. No pertinent past medical history.  Past Surgical History:   Procedure Laterality Date    ESSURE TUBAL LIGATION Bilateral       General Information       Row Name 25          OT Time and Intention    Document Type discharge evaluation/summary  -     Mode of Treatment occupational therapy  -       Row Name 25          General Information    Patient Profile Reviewed yes  -AJ     Prior Level of Function independent:;all household mobility;community mobility;gait;transfer;bed mobility;ADL's;driving;work  Independent with ADLs/IADLs, denies DME use, recently diagnosed with syncope.  -     Barriers to Rehab none identified  -       Row Name 25          Living Environment    Current Living Arrangements home  -     People in Home child(min), dependent;significant other  Lives with teenage children and   -       Row Name 25          Cognition    Orientation Status (Cognition) oriented x 4  -               User Key  (r) = Recorded By, (t) = Taken By, (c) = Cosigned By      Initials Name Provider Type     Layla Henry OT Occupational Therapist                   Mobility/ADL's       Row Name 25          Bed Mobility    Comment, (Bed Mobility) Pt on commode upon OT entry  -       Row Name 25          Transfers    Transfers toilet transfer;sit-stand transfer  -       Row Name 25          Sit-Stand Transfer    Sit-Stand [place] (Transfers)  supervision  -     Comment, (Sit-Stand Transfer) No AD  -       Row Name 07/02/25 0831          Toilet Transfer    Type (Toilet Transfer) sit-stand;stand-sit  -     [place] Level (Toilet Transfer) modified independence  -     Assistive Device (Toilet Transfer) commode;grab bars/safety frame  -       Row Name 07/02/25 0831          Functional Mobility    Functional Mobility- Ind. Level standby assist  -     Functional Mobility- Device other (see comments)  No AD  -     Functional Mobility-Distance (Feet) --  <HH distance  -     Functional Mobility- Comment No balance impairments noted with in room ambulation for ADLs  -       Row Name 07/02/25 0831          Activities of Daily Living    BADL Assessment/Intervention upper body dressing;lower body dressing;grooming;toileting  -       Row Name 07/02/25 0831          Upper Body Dressing Assessment/Training    [place] Level (Upper Body Dressing) don;doff;front opening garment;set up  -AJ     Position (Upper Body Dressing) supported sitting  -[facility] Name 07/02/25 0831          Lower Body Dressing Assessment/Training    [place] Level (Lower Body Dressing) don;doff;socks;set up  -AJ     Position (Lower Body Dressing) supported sitting  -       Row Name 07/02/25 0831          Grooming Assessment/Training    [place] Level (Grooming) oral care regimen;wash face, hands;set up  -AJ     Position (Grooming) unsupported standing  -[facility] Name 07/02/25 0831          Toileting Assessment/Training    [place] Level (Toileting) adjust/manage clothing;perform perineal hygiene;modified independence  -     Assistive Devices (Toileting) commode;grab bar/safety frame  -     Position (Toileting) unsupported sitting;unsupported standing  -               User Key  (r) = Recorded By, (t) = Taken By, (c) = Cosigned By      Initials Name Provider Type    Layla Avina OT Occupational Therapist                   Obj/Interventions        [facility] Name 07/02/25 0833          Sensory Assessment (Somatosensory)    Sensory Assessment (Somatosensory) UE sensation intact  -[facility] Name 07/02/25 0833          Vision Assessment/Intervention    Visual Impairment/Limitations WFL  -     Vision Assessment Comment Able to track in all planes  -[facility] Name 07/02/25 0833          Range of Motion Comprehensive    General Range of Motion bilateral upper extremity ROM WFL  -[facility] Name 07/02/25 0833          Strength Comprehensive (MMT)    General Manual Muscle Testing (MMT) Assessment no strength deficits identified  -AJ       Row Name 07/02/25 0833          Balance    Balance Assessment sitting static balance;sitting dynamic balance;sit to stand dynamic balance;standing static balance;standing dynamic balance  -     Static Sitting Balance independent  -     Dynamic Sitting Balance independent  -     Position, Sitting Balance unsupported;sitting in chair;other (see comments)  commode  -     Static Standing Balance supervision  -     Dynamic Standing Balance standby assist  -     Position/Device Used, Standing Balance unsupported  -     Balance Interventions sitting;standing;sit to stand;static;supported;dynamic;occupation based/functional task  -               User Key  (r) = Recorded By, (t) = Taken By, (c) = Cosigned By      Initials Name Provider Type    Layla Avina OT Occupational Therapist                   Goals/Plan    No documentation.                  Clinical Impression       [facility] Name 07/02/25 0834          Pain Assessment    Pretreatment Pain Rating 0/10 - no pain  -     Posttreatment Pain Rating 0/10 - no pain  -AJ       Row Name 07/02/25 0834          Plan of Care Review    Plan of Care Reviewed With patient;spouse  -     Progress no change  -     Outcome Evaluation OT eval complete. Pt presents at functional baseline, L side weakness and impaired sensation resolved at time of OT eval. No further skilled  OT services are warranted. Rec d/c to home with assist.  -       Row Name 07/02/25 0834          Therapy Assessment/Plan (OT)    Criteria for Skilled Therapeutic Interventions Met (OT) no problems identified which require skilled intervention  -     Therapy Frequency (OT) evaluation only  -       Row Name 07/02/25 0834          Therapy Plan Review/Discharge Plan (OT)    Anticipated Discharge Disposition (OT) home with assist  -       Row Name 07/02/25 0834          Vital Signs    Pre Systolic BP Rehab 103  -AJ     Pre Treatment Diastolic BP 83  -AJ     Post Systolic BP Rehab 100  -AJ     Post Treatment Diastolic BP 80  -AJ     Pretreatment Heart Rate (beats/min) 85  -AJ     Posttreatment Heart Rate (beats/min) 90  -AJ     Pre SpO2 (%) 99  -AJ     O2 Delivery Pre Treatment room air  -AJ     Post SpO2 (%) 99  -AJ     O2 Delivery Post Treatment room air  -AJ     Pre Patient Position Sitting  -AJ     Intra Patient Position Standing  -AJ     Post Patient Position Sitting  -       Row Name 07/02/25 0834          Positioning and Restraints    Pre-Treatment Position bathroom  -AJ     Post Treatment Position chair  -AJ     In Chair notified nsg;reclined;sitting;call light within reach;encouraged to call for assist;exit alarm on;with family/caregiver;legs elevated;waffle cushion  -AJ               User Key  (r) = Recorded By, (t) = Taken By, (c) = Cosigned By      Initials Name Provider Type    Layla Avina, OT Occupational Therapist                   Outcome Measures       Row Name 07/02/25 0836          How much help from another is currently needed...    Putting on and taking off regular lower body clothing? 4  -AJ     Bathing (including washing, rinsing, and drying) 4  -AJ     Toileting (which includes using toilet bed pan or urinal) 4  -AJ     Putting on and taking off regular upper body clothing 4  -AJ     Taking care of personal grooming (such as brushing teeth) 4  -AJ     Eating meals 4  -AJ     AM-PAC  6 Clicks Score (OT) 24  -       Row Name 07/02/25 0836          Modified [place] Scale    Pre-Stroke Modified [place] Scale 6 - Unable to determine (UTD) from the medical record documentation  -     Modified [place] Scale 0 - No Symptoms at all.  -       Row Name 07/02/25 0836          Functional Assessment    Outcome Measure Options AM-PAC 6 Clicks Daily Activity (OT);Modified [place]  -               User Key  (r) = Recorded By, (t) = Taken By, (c) = Cosigned By      Initials Name Provider Type    Layla Avina OT Occupational Therapist                  Occupational Therapy Education       Title: PT OT SLP Therapies (In Progress)       Topic: Occupational Therapy (In Progress)       Point: ADL training (Done)       Learning Progress Summary            Patient Acceptance, E, VU by YUSUF at 7/2/2025 0837                      Point: Precautions (Done)       Learning Progress Summary            Patient Acceptance, E, VU by YUSUF at 7/2/2025 0837                      Point: Body mechanics (Done)       Learning Progress Summary            Patient Acceptance, E, VU by YUSUF at 7/2/2025 0837                                      User Key       Initials Effective Dates Name Provider Type Discipline     08/26/24 -  Layla Henry, OT Occupational Therapist OT                  OT Recommendation and Plan  Therapy Frequency (OT): evaluation only  Plan of Care Review  Plan of Care Reviewed With: patient, spouse  Progress: no change  Outcome Evaluation: OT eval complete. Pt presents at functional baseline, L side weakness and impaired sensation resolved at time of OT eval. No further skilled OT services are warranted. Rec d/c to home with assist.  Plan of Care Reviewed With: patient, spouse  Outcome Evaluation: OT eval complete. Pt presents at functional baseline, L side weakness and impaired sensation resolved at time of OT eval. No further skilled OT services are warranted. Rec d/c to home with assist.     Time Calculation:    Evaluation Complexity (OT)  Review Occupational Profile/Medical/Therapy History Complexity: brief/low complexity  Assessment, Occupational Performance/Identification of Deficit Complexity: 1-3 performance deficits  Clinical Decision Making Complexity (OT): problem focused assessment/low complexity  Overall Complexity of Evaluation (OT): low complexity     Time Calculation- OT       Row Name 07/02/25 0837             Time Calculation- OT    OT Start Time 0750  -AJ      OT Received On 07/02/25  -AJ         Untimed Charges    OT Eval/Re-eval Minutes 48  -AJ         Total Minutes    Untimed Charges Total Minutes 48  -AJ       Total Minutes 48  -AJ                User Key  (r) = Recorded By, (t) = Taken By, (c) = Cosigned By      Initials Name Provider Type    AJ Layla Henry OT Occupational Therapist                  Therapy Charges for Today       Code Description Service Date Service Provider Modifiers Qty    12197777589  OT EVAL LOW COMPLEXITY 4 7/2/2025 Layla Henry OT GO 1               OT Discharge Summary  Anticipated Discharge Disposition (OT): home with assist  Reason for Discharge: At baseline function  Discharge Destination: Home with assist    Layla Henry OT  7/2/2025

## 2025-07-02 NOTE — PLAN OF CARE
Patient is Alert, Oriented x 4, and maintaining O2 saturation >90% on Room Air. No witnessed or reported seizure activity. NIH score increased by 1 point d/t slight aphasia. Reports of pain in left calf, relieved w/ tylenol.   Free of falls and injuries, will continue to monitor and follow plan of care.  Problem: Adult Inpatient Plan of Care  Goal: Optimal Comfort and Wellbeing  Outcome: Progressing  Intervention: Monitor Pain and Promote Comfort  Description: Assess pain level, treatment efficacy and patient response at regular intervals using a consistent pain scale.Consider the presence and impact of preexisting chronic pain.Encourage patient and caregiver involvement in pain assessment, interventions and safety measures.Promote activity; balance with sleep and rest to enhance healing.  Recent Flowsheet Documentation  Taken 7/1/2025 2008 by Yobany Novak RN  Pain Management Interventions: pain medication given  Intervention: Provide Person-Centered Care  Description: Use a family-focused approach to care; encourage support system presence and participation.Develop trust and rapport by proactively providing information, encouraging questions, addressing concerns and offering reassurance.Acknowledge emotional response to hospitalization.Recognize and utilize personal coping strategies and strengths; develop goals via shared decision-making.Honor spiritual and cultural preferences.  Recent Flowsheet Documentation  Taken 7/2/2025 0600 by Yobany Novak RN  Trust Relationship/Rapport:   questions answered   reassurance provided   thoughts/feelings acknowledged   questions encouraged  Taken 7/2/2025 0400 by Yobany Novak RN  Trust Relationship/Rapport:   questions answered   reassurance provided   thoughts/feelings acknowledged   questions encouraged  Taken 7/2/2025 0200 by Yobany Novak RN  Trust Relationship/Rapport:   questions answered   reassurance provided   thoughts/feelings acknowledged   questions  encouraged  Taken 7/2/2025 0000 by Yobany Novak RN  Trust Relationship/Rapport:   questions answered   reassurance provided   thoughts/feelings acknowledged   questions encouraged  Taken 7/1/2025 2200 by Yobany Novak RN  Trust Relationship/Rapport:   questions answered   reassurance provided   thoughts/feelings acknowledged   questions encouraged  Taken 7/1/2025 2000 by Yobany Novak RN  Trust Relationship/Rapport:   questions encouraged   questions answered   reassurance provided   thoughts/feelings acknowledged   Goal Outcome Evaluation:     Problem: Stroke, Ischemic (Includes Transient Ischemic Attack)  Goal: Optimal Coping  Outcome: Progressing  Intervention: Support Psychosocial Response to Stroke  Description: Acknowledge patient and family response to the sudden impact of stroke, resulting impairments and uncertainty in recovery.Encourage verbalization of feelings regarding current situation; provide active and empathic listening; be sensitive to nonverbal communication.Engage in early, proactive and ongoing discussion about goals of care and what matters most to them; facilitate shared decision-making.Assess and monitor perspective on quality of life, personal and family wellbeing; consider palliative care approach to enhance symptom relief and quality of life.Empower patient and support system to ask questions and be actively involved in decision-making.Acknowledge and validate significance of lifestyle changes and expectations (e.g., roles and identity, rehabilitation, medication regimen, diet, exercise).Recognize current coping strategies and assist in developing new strategies, such as mindfulness, relaxation and music.Assess and monitor for signs and symptoms of poststroke depression and anxiety; consider brief psychological intervention, such as motivational interviewing or problem solving. Refer for comprehensive evaluation and treatment if symptoms persist.  Recent Flowsheet Documentation  Taken  7/1/2025 2000 by Yobany Novak RN  Supportive Measures: active listening utilized  Family/Support System Care: caregiver stress acknowledged     Problem: Stroke, Ischemic (Includes Transient Ischemic Attack)  Goal: Optimal Cerebral Tissue Perfusion  Outcome: Progressing  Intervention: Protect and Optimize Cerebral Perfusion  Description: Closely monitor neurologic status to prevent or minimize further neurologic damage; evaluate using a validated tool.Implement hemodynamic, neurologic and cardiac monitoring to guide care, based on individual targeted parameters.Reassess blood pressure frequently; manage hypertension or hypotension to attain recommended goal based on stroke treatment.Anticipate fluid and pharmacologic therapy to improve cerebral perfusion and intracranial pressure goals; titrate to target threshold and patient response; avoid fluid overload.Manage fever; maintain normothermia to preserve cerebral metabolism.Position head of bed to maintain cerebral perfusion; place head in midline position, as tolerated, to enhance venous flow.Minimize activity that increases intrathoracic or intra-abdominal pressure resulting in decreased venous outflow (e.g., hip flexion, Valsalva maneuver, coughing, vomiting).Avoid hypoglycemia; maintain glycemic control.Decrease environmental stimulation; promote low lighting, calm environment, clustered care.Provide seizure surveillance; if seizure occurs, maintain safety and anticipate antiseizure medication administration and postseizure care.Maintain external ventricular drain when present (e.g., zeroed, closed/intermittent drainage, open/continuous drainage); monitor cerebrospinal fluid characteristics.Anticipate procedure or surgery to restore blood flow and decrease cerebral pressure, such as intravenous or intra-arterial thrombolytic, mechanical embolectomy, craniectomy, hemicraniectomy, carotid endarterectomy, angioplasty, stenting or ventricul  Recent Flowsheet  Documentation  Taken 7/2/2025 0600 by Yobany Novak RN  Stabilization Measures: legs elevated  Taken 7/2/2025 0400 by Yobany Novak RN  Stabilization Measures: legs elevated  Cerebral Perfusion Promotion: blood pressure monitored  Taken 7/2/2025 0200 by Yobany Novak RN  Stabilization Measures: legs elevated  Taken 7/2/2025 0000 by Yobany Novak RN  Stabilization Measures: legs elevated  Cerebral Perfusion Promotion: blood pressure monitored  Taken 7/1/2025 2200 by Yobany Novak RN  Stabilization Measures: legs elevated  Cerebral Perfusion Promotion: blood pressure monitored  Taken 7/1/2025 2000 by Yobany Novak RN  Stabilization Measures: legs elevated  Fluid/Electrolyte Management: fluids provided  Cerebral Perfusion Promotion: blood pressure monitored

## 2025-07-02 NOTE — CASE MANAGEMENT/SOCIAL WORK
Discharge Planning Assessment  [facility]     Patient Name: Carina DUVALL  MRN: [id number]  Today's Date: 7/2/2025    Admit Date: 7/1/2025    Plan: Home   Discharge Needs Assessment       Row Name 07/02/25 0914       Living Environment    People in Home child(min), dependent;spouse    Current Living Arrangements home    Potentially Unsafe Housing Conditions none    Primary Care Provided by self    Provides Primary Care For no one    Family Caregiver if Needed spouse    Quality of Family Relationships involved;supportive    Able to Return to Prior Arrangements yes       Resource/Environmental Concerns    Resource/Environmental Concerns none    Transportation Concerns none       Transition Planning    Patient/Family Anticipates Transition to home with family    Patient/Family Anticipated Services at Transition ;rehabilitation services    Transportation Anticipated family or friend will provide       Discharge Needs Assessment    Readmission Within the Last 30 Days no previous admission in last 30 days    Equipment Currently Used at Home none    Concerns to be Addressed discharge planning    Anticipated Changes Related to Illness none                   Discharge Plan       Row Name 07/02/25 0914       Plan    Plan Home    Patient/Family in Agreement with Plan yes    Plan Comments Spoke with patient in room to initiate discharge planning.  She lives with her  and dependent children in [facility].  She is independent with ADL's.  She has no DME at home and is not current with home health.  Her PCP is Ambar Ornelas.  She does not have an advanced directive.  Verified that she has insurance coverage through [employer] .  Ms. Duvall has RX coverage and has her scripts filled at [facility].  Her goal is to return home at discharge.  No needs noted at this time.  CM will continue to follow.    Final Discharge Disposition Code 01 - home or self-care                   Continued Care and Services - Admitted Since 7/1/2025    No active coordination exists.       Expected Discharge Date and Time       Expected Discharge Date Expected Discharge Time    Jul 3, 2025            Demographic Summary       Row Name 07/02/25 0914       General Information    Admission Type observation    Arrived From emergency department    Referral Source admission list    Reason for Consult discharge planning    Preferred Language English                   Functional Status       Row Name 07/02/25 0914       Functional Status    Usual Activity Tolerance good    Current Activity Tolerance good       Functional Status, IADL    Medications independent    Meal Preparation independent    Housekeeping independent    Laundry independent    Shopping independent                   Psychosocial    No documentation.                  Abuse/Neglect    No documentation.                  Legal    No documentation.                  Substance Abuse    No documentation.                  Patient Forms    No documentation.                     Loren Geller RN

## 2025-07-02 NOTE — PLAN OF CARE
Goal Outcome Evaluation:  Plan of Care Reviewed With: patient           Outcome Evaluation: Patient presents at her functional baseline with ability to ambulate 300' SBA unsupported and perform dynamic gait tasks without difficulty. No IPPT needs identified. Will sign off and recommend D/C home when medically appropriate.    Anticipated Discharge Disposition (PT): home with assist

## 2025-07-02 NOTE — THERAPY DISCHARGE NOTE
Acute Care - Speech Language Pathology Initial Evaluation/Discharge  [facility]  Cognitive-Communication Evaluation       Patient Name: Carina MCCOLLUM  : 1985  MRN: [id number]  Today's Date: 2025               Admit Date: 2025     Visit Dx:    ICD-10-CM ICD-9-CM   1. Left sided numbness  R20.0 782.0   2. Recurrent headache  R51.9 784.0   3. Hypertension, unspecified type  I10 401.9     Patient Active Problem List   Diagnosis    Left-sided weakness    Essential hypertension     History reviewed. No pertinent past medical history.  Past Surgical History:   Procedure Laterality Date    ESSURE TUBAL LIGATION Bilateral        SLP Recommendation and Plan  SLP Diagnosis: functional speech/language skills, functional cognitive-linguistic skills (25 150)  SLP Diagnosis Comments: Cognitive-linguistic skills seemingly WFL in informal conversation, which included discussion of diagnosis and planning for discharge. Pt declined further comprehensive cognitive assessment. Pt/spouse denied noting acute deficits. (25 1501)     SLC Criteria for Skilled Therapy Interventions Met: no problems identified which require skilled intervention, declined skilled intervention at this time (25 1501)  Anticipated Discharge Disposition (SLP): No further SLP services warranted (25 150)                 SLP EVALUATION (Last 72 Hours)       SLP SLC Evaluation       Row Name 25                   Communication Assessment/Intervention    Document Type discharge evaluation/summary  -AC        Subjective Information no complaints  -AC        Patient Observations alert;cooperative  -AC        Patient/Family/Caregiver Comments/Observations Spouse present.  -AC        Patient Effort good  -AC           General Information    Patient Profile Reviewed yes  -AC        Pertinent History Of Current Problem L sided weakness. MRI negative for acute stroke. NIH now 0. Sxs likely 2' BP per chart.  -AC         Precautions/Limitations, Vision WFL;for purposes of eval  -AC        Precautions/Limitations, Hearing WFL;for purposes of eval  -AC        Prior Level of Function-Communication WFL  -AC        Plans/Goals Discussed with patient;spouse/S.O.;agreed upon  -        Barriers to Rehab none identified  -        Patient's Goals for Discharge return to home;return to work  -        Family Goals for Discharge family did not state  -           Pain    Additional Documentation Pain Scale: FACES Pre/Post-Treatment (Group)  -           Pain Scale: FACES Pre/Post-Treatment    Pain: FACES Scale, Pretreatment 0-->no hurt  -AC        Posttreatment Pain Rating 0-->no hurt  -AC           Comprehension Assessment/Intervention    Comprehension Assessment/Intervention Auditory Comprehension  -AC           Auditory Comprehension Assessment/Intervention    Auditory Comprehension (Communication) WFL  -AC        Narrative Discourse WFL  -AC           Expression Assessment/Intervention    Expression Assessment/Intervention verbal expression;graphic expression  -           Verbal Expression Assessment/Intervention    Verbal Expression WFL  -AC        Conversational Discourse/Fluency WFL  -AC           Motor Speech Assessment/Intervention    Motor Speech Function WFL  -AC        Conversational Speech (Communication) WFL  -AC        Speech intelligibility 100%;in quiet environment;in connected speech;with unfamiliar listener  -           Cursory Voice Assessment/Intervention    Quality and Resonance (Voice) WFL  -AC           Cognitive Assessment Intervention- SLP    Cognitive Function (Cognition) WFL  -AC        Orientation Status (Cognition) WFL;person;place;situation  -        Memory (Cognitive) WFL;immediate  -AC        Attention (Cognitive) WFL;sustained;selective  -AC        Executive Function (Cognition) WFL  -AC        Pragmatics (Communication) WFL  -AC           SLP Evaluation Clinical Impressions    SLP Diagnosis  functional speech/language skills;functional cognitive-linguistic skills  -        SLP Diagnosis Comments Cognitive-linguistic skills seemingly WFL in informal conversation, which included discussion of diagnosis and planning for discharge. Pt declined further comprehensive cognitive assessment. Pt/spouse denied noting acute deficits.  -        SLC Criteria for Skilled Therapy Interventions Met no problems identified which require skilled intervention;declined skilled intervention at this time  -           Recommendations    Anticipated Discharge Disposition (SLP) No further SLP services warranted  -                  User Key  (r) = Recorded By, (t) = Taken By, (c) = Cosigned By      Initials Name Effective Dates    AC Clau Islas MS CCC-SLP 02/03/23 -                        EDUCATION  The patient has been educated in the following areas:   Cognitive Impairment Communication Impairment.                    Time Calculation:    Time Calculation- SLP       Row Name 07/02/25 1534             Time Calculation- SLP    SLP Start Time 1501  -      SLP Received On 07/02/25  -         Untimed Charges    35345-AS Eval Speech and Production w/ Language Minutes 16  -AC         Total Minutes    Untimed Charges Total Minutes 16  -AC       Total Minutes 16  -AC                User Key  (r) = Recorded By, (t) = Taken By, (c) = Cosigned By      Initials Name Provider Type     Clau Islas MS CCC-SLP Speech and Language Pathologist                    Therapy Charges for Today       Code Description Service Date Service Provider Modifiers Qty    [id number] HC ST EVAL SPEECH AND PROD W LANG  1 7/2/2025 Clau Islas MS CCC-SLP GN 1                     SLP Discharge Summary  Anticipated Discharge Disposition (SLP): No further SLP services warranted    MS YAMILETH LutzSLP  7/2/2025

## 2025-07-02 NOTE — PLAN OF CARE
Goal Outcome Evaluation:  Plan of Care Reviewed With: patient, spouse        Progress: no change  Outcome Evaluation: OT eval complete. Pt presents at functional baseline, L side weakness and impaired sensation resolved at time of OT eval. No further skilled OT services are warranted. Rec d/c to home with assist.    Anticipated Discharge Disposition (OT): home with assist

## 2025-07-02 NOTE — PROGRESS NOTES
Stroke Progress Note       Chief Complaint:   left side paresthesias     Subjective    Subjective     Subjective:  Symptoms resolved    Review of Systems   Constitutional: No fatigue  HENT: Negative for nosebleeds and rhinorrhea.    Eyes: Negative for redness.   Respiratory: Negative for cough.    Gastrointestinal: Negative for anal bleeding.   Endocrine: Negative for polydipsia.   Genitourinary: Negative for enuresis and urgency.   Musculoskeletal: Negative for joint swelling.   Neurological: Negative for tremors.   Psychiatric/Behavioral: Negative for hallucinations.      Objective      Temp:  [98.1 °F (36.7 °C)-98.9 °F (37.2 °C)] 98.1 °F (36.7 °C)  Heart Rate:  [58-83] 63  Resp:  [16-18] 18  BP: (142-196)/() 185/100        MENTAL STATUS: AAOx3, memory intact, fund of knowledge appropriate    LANG/SPEECH: Naming and repetition intact, fluent, follows 3-step commands    CRANIAL NERVES:    Pupils equal and reactive, EOMI intact, no gaze deviation, no nystagmus  No facial droop, cough and gag intact, shoulder shrug intact, tongue midline     MOTOR:  Moves all extremities equally    SENSORY: Normal to light touch all throughout     COORDINATION: Normal finger to nose and heel to shin, no tremor, no dysmetria    STATION: Not assessed due to patient condition    GAIT: Not assessed due to patient condition   Results Review:    I reviewed the patient's new clinical results.    Lab Results (last 24 hours)       Procedure Component Value Units Date/Time    Basic Metabolic Panel [[id number]]  (Abnormal) Collected: 07/02/25 0433    Specimen: Blood Updated: 07/02/25 0636     Glucose 99 mg/dL      BUN 6.2 mg/dL      Creatinine 0.51 mg/dL      Sodium 137 mmol/L      Potassium 4.2 mmol/L      Chloride 108 mmol/L      CO2 20.7 mmol/L      Calcium 8.4 mg/dL      BUN/Creatinine Ratio 12.2     Anion Gap 8.3 mmol/L      eGFR 120.4 mL/min/1.73     Narrative:      GFR Categories in Chronic Kidney Disease (CKD)              GFR  Category          GFR (mL/min/1.73)    Interpretation  G1                    90 or greater        Normal or high (1)  G2                    60-89                Mild decrease (1)  G3a                   45-59                Mild to moderate decrease  G3b                   30-44                Moderate to severe decrease  G4                    15-29                Severe decrease  G5                    14 or less           Kidney failure    (1)In the absence of evidence of kidney disease, neither GFR category G1 or G2 fulfill the criteria for CKD.    eGFR calculation 2021 CKD-EPI creatinine equation, which does not include race as a factor    Magnesium [[id number]]  (Normal) Collected: 07/02/25 0433    Specimen: Blood Updated: 07/02/25 0635     Magnesium 2.0 mg/dL     Lipid Panel [[id number]]  (Abnormal) Collected: 07/02/25 0433    Specimen: Blood Updated: 07/02/25 0624     Total Cholesterol 120 mg/dL      Triglycerides 129 mg/dL      HDL Cholesterol 22 mg/dL      LDL Cholesterol  75 mg/dL      VLDL Cholesterol 23 mg/dL      LDL/HDL Ratio 3.28    Narrative:      Cholesterol Reference Ranges  (U.S. Department of Health and Human Services ATP III Classifications)    Desirable          <200 mg/dL  Borderline High    200-239 mg/dL  High Risk          >240 mg/dL      Triglyceride Reference Ranges  (U.S. Department of Health and Human Services ATP III Classifications)    Normal           <150 mg/dL  Borderline High  150-199 mg/dL  High             200-499 mg/dL  Very High        >500 mg/dL    HDL Reference Ranges  (U.S. Department of Health and Human Services ATP III Classifications)    Low     <40 mg/dl (major risk factor for CHD)  High    >60 mg/dl ('negative' risk factor for CHD)        LDL Reference Ranges  (U.S. Department of Health and Human Services ATP III Classifications)    Optimal          <100 mg/dL  Near Optimal     100-129 mg/dL  Borderline High  130-159 mg/dL  High             160-189 mg/dL  Very High         >189 mg/dL    LDL is calculated using the NIH LDL-C calculation.      Hemoglobin A1c [[id number]]  (Normal) Collected: 07/02/25 0433    Specimen: Blood Updated: 07/02/25 0534     Hemoglobin A1C 5.47 %     Narrative:      Hemoglobin A1C Ranges:    Increased Risk for Diabetes  5.7% to 6.4%  Diabetes                     >= 6.5%  Diabetic Goal                < 7.0%    POC Glucose Once [[id number]]  (Normal) Collected: 07/01/25 2325    Specimen: Blood Updated: 07/01/25 2326     Glucose 105 mg/dL     POC Glucose Once [[id number]]  (Normal) Collected: 07/01/25 1918    Specimen: Blood Updated: 07/01/25 1919     Glucose 104 mg/dL     High Sensitivity Troponin T 1Hr [[id number]] Collected: 07/01/25 1431    Specimen: Blood Updated: 07/01/25 1508     HS Troponin T 11 ng/L      Troponin T Numeric Delta --     Comment: Unable to calculate.       Narrative:      High Sensitive Troponin T Reference Range:  <14.0 ng/L- Negative Female for AMI  <22.0 ng/L- Negative Male for AMI  >=14 - Abnormal Female indicating possible myocardial injury.  >=22 - Abnormal Male indicating possible myocardial injury.   Clinicians would have to utilize clinical acumen, EKG, Troponin, and serial changes to determine if it is an Acute Myocardial Infarction or myocardial injury due to an underlying chronic condition.         Fentanyl, Urine - Urine, Clean Catch [[id number]]  (Normal) Collected: 07/01/25 1327    Specimen: Urine, Clean Catch Updated: 07/01/25 1403     Fentanyl, Urine Negative    Narrative:      Negative Threshold:      Fentanyl 5 ng/mL     The normal value for the drug tested is negative. This report includes final unconfirmed screening results to be used for medical treatment purposes only. Unconfirmed results must not be used for non-medical purposes such as employment or legal testing. Clinical consideration should be applied to any drug of abuse test, particularly when unconfirmed results are used.           Urine Drug Screen - Urine, Clean  Catch [[id number]]  (Abnormal) Collected: 07/01/25 1327    Specimen: Urine, Clean Catch Updated: 07/01/25 1350     THC, Screen, Urine Positive     Phencyclidine (PCP), Urine Negative     Cocaine Screen, Urine Negative     Methamphetamine, Ur Negative     Opiate Screen Negative     Amphetamine Screen, Urine Negative     Benzodiazepine Screen, Urine Negative     Tricyclic Antidepressants Screen Negative     Methadone Screen, Urine Negative     Barbiturates Screen, Urine Negative     Oxycodone Screen, Urine Negative     Buprenorphine, Screen, Urine Negative    Narrative:      Cutoff For Drugs Screened:    Amphetamines               500 ng/ml  Barbiturates               200 ng/ml  Benzodiazepines            150 ng/ml  Cocaine                    150 ng/ml  Methadone                  200 ng/ml  Opiates                    100 ng/ml  Phencyclidine               25 ng/ml  THC                         50 ng/ml  Methamphetamine            500 ng/ml  Tricyclic Antidepressants  300 ng/ml  Oxycodone                  100 ng/ml  Buprenorphine               10 ng/ml    The normal value for all drugs tested is negative. This report includes unconfirmed screening results, with the cutoff values listed, to be used for medical treatment purposes only.  Unconfirmed results must not be used for non-medical purposes such as employment or legal testing.  Clinical consideration should be applied to any drug of abuse test, particularly when unconfirmed results are used.      Comprehensive Metabolic Panel [[id number]]  (Abnormal) Collected: 07/01/25 1309    Specimen: Blood Updated: 07/01/25 1350     Glucose 111 mg/dL      BUN 3.8 mg/dL      Creatinine 0.56 mg/dL      Sodium 139 mmol/L      Potassium 3.7 mmol/L      Comment: Specimen hemolyzed.  Result may be falsely elevated.        Chloride 105 mmol/L      CO2 22.6 mmol/L      Calcium 9.0 mg/dL      Total Protein 7.4 g/dL      Albumin 4.5 g/dL      ALT (SGPT) 8 U/L      AST (SGOT) 20 U/L       Alkaline Phosphatase 87 U/L      Total Bilirubin 0.4 mg/dL      Globulin 2.9 gm/dL      Comment: Calculated Result        A/G Ratio 1.6 g/dL      BUN/Creatinine Ratio 6.8     Anion Gap 11.4 mmol/L      eGFR 117.8 mL/min/1.73     Narrative:      GFR Categories in Chronic Kidney Disease (CKD)              GFR Category          GFR (mL/min/1.73)    Interpretation  G1                    90 or greater        Normal or high (1)  G2                    60-89                Mild decrease (1)  G3a                   45-59                Mild to moderate decrease  G3b                   30-44                Moderate to severe decrease  G4                    15-29                Severe decrease  G5                    14 or less           Kidney failure    (1)In the absence of evidence of kidney disease, neither GFR category G1 or G2 fulfill the criteria for CKD.    eGFR calculation 2021 CKD-EPI creatinine equation, which does not include race as a factor    BNP [[id number]]  (Normal) Collected: 07/01/25 1309    Specimen: Blood Updated: 07/01/25 1346     proBNP 85.8 pg/mL     Narrative:      This assay is used as an aid in the diagnosis of individuals suspected of having heart failure. It can be used as an aid in the diagnosis of acute decompensated heart failure (ADHF) in patients presenting with signs and symptoms of ADHF to the emergency department (ED). In addition, NT-proBNP of <300 pg/mL indicates ADHF is not likely.    Age Range Result Interpretation  NT-proBNP Concentration (pg/mL:      <50             Positive            >450                   Gray                 300-450                    Negative             <300    50-75           Positive            >900                  Gray                300-900                  Negative            <300      >75             Positive            >1800                  Gray                300-1800                  Negative            <300    High Sensitivity Troponin T [[id number]]   (Normal) Collected: 07/01/25 1309    Specimen: Blood Updated: 07/01/25 1346     HS Troponin T <6 ng/L     Narrative:      High Sensitive Troponin T Reference Range:  <14.0 ng/L- Negative Female for AMI  <22.0 ng/L- Negative Male for AMI  >=14 - Abnormal Female indicating possible myocardial injury.  >=22 - Abnormal Male indicating possible myocardial injury.   Clinicians would have to utilize clinical acumen, EKG, Troponin, and serial changes to determine if it is an Acute Myocardial Infarction or myocardial injury due to an underlying chronic condition.         Pregnancy, Urine - Urine, Clean Catch [[id number]]  (Normal) Collected: 07/01/25 1327    Specimen: Urine, Clean Catch Updated: 07/01/25 1343     HCG, Urine QL Negative    Protime-INR [[id number]]  (Normal) Collected: 07/01/25 1309    Specimen: Blood Updated: 07/01/25 1340     Protime 13.6 Seconds      INR 0.99    aPTT [[id number]]  (Normal) Collected: 07/01/25 1309    Specimen: Blood Updated: 07/01/25 1340     PTT 27.4 seconds     Narrative:      PTT = The equivalent PTT values for the therapeutic range of heparin levels at 0.3 to 0.5 U/ml are 60 to 70 seconds.    [place] Draw [[id number]] Collected: 07/01/25 1309    Specimen: Blood Updated: 07/01/25 1330    Narrative:      The following orders were created for panel order [place] Draw.  Procedure                               Abnormality         Status                     ---------                               -----------         ------                     Green Top (Gel)[[id number]]                                  Final result               Lavender Top[[id number]]                                     Final result               Gold Top - SST[[id number]]                                   Final result               Gray Top[[id number]]                                         Final result               Light Blue Top[[id number]]                                   Final result                 Please view results for  these tests on the individual orders.    Green Top (Gel) [[id number]] Collected: 07/01/25 1309    Specimen: Blood Updated: 07/01/25 1330     Extra Tube Hold for add-ons.     Comment: Auto resulted.       Lavender Top [[id number]] Collected: 07/01/25 1309    Specimen: Blood Updated: 07/01/25 1330     Extra Tube hold for add-on     Comment: Auto resulted       Gold Top - SST [[id number]] Collected: 07/01/25 1309    Specimen: Blood Updated: 07/01/25 1330     Extra Tube Hold for add-ons.     Comment: Auto resulted.       Gray Top [[id number]] Collected: 07/01/25 1309    Specimen: Blood Updated: 07/01/25 1330     Extra Tube Hold for add-ons.     Comment: Auto resulted.       Light Blue Top [[id number]] Collected: 07/01/25 1309    Specimen: Blood Updated: 07/01/25 1330     Extra Tube Hold for add-ons.     Comment: Auto resulted       CBC & Differential [[id number]]  (Abnormal) Collected: 07/01/25 1309    Specimen: Blood Updated: 07/01/25 1323    Narrative:      The following orders were created for panel order CBC & Differential.  Procedure                               Abnormality         Status                     ---------                               -----------         ------                     CBC Auto Differential[[id number]]        Abnormal            Final result                 Please view results for these tests on the individual orders.    CBC Auto Differential [[id number]]  (Abnormal) Collected: 07/01/25 1309    Specimen: Blood Updated: 07/01/25 1323     WBC 10.21 10*3/mm3      RBC 4.88 10*6/mm3      Hemoglobin 12.5 g/dL      Hematocrit 39.1 %      MCV 80.1 fL      MCH 25.6 pg      MCHC 32.0 g/dL      RDW 18.6 %      RDW-SD 53.4 fl      MPV 9.4 fL      Platelets 397 10*3/mm3      Neutrophil % 70.3 %      Lymphocyte % 22.0 %      Monocyte % 6.0 %      Eosinophil % 0.6 %      Basophil % 0.6 %      Immature Grans % 0.5 %      Neutrophils, Absolute 7.18 10*3/mm3      Lymphocytes, Absolute 2.25 10*3/mm3      Monocytes,  Absolute 0.61 10*3/mm3      Eosinophils, Absolute 0.06 10*3/mm3      Basophils, Absolute 0.06 10*3/mm3      Immature Grans, Absolute 0.05 10*3/mm3      nRBC 0.0 /100 WBC     POC CHEM 8 [[id number]]  (Abnormal) Collected: 07/01/25 1310    Specimen: Blood Updated: 07/01/25 1319     Glucose 111 mg/dL      BUN <3 mg/dL      Creatinine 0.50 mg/dL      Sodium 140 mmol/L      POC Potassium 3.6 mmol/L      Chloride 104 mmol/L      Total CO2 21 mmol/L      Hemoglobin 14.3 g/dL      Comment: Serial Number: [id number]:  [id number]        Hematocrit 42 %      Ionized Calcium 1.19 mmol/L      eGFR 121.0 mL/min/1.73           EEG  Result Date: 7/1/2025  Normal study This report is transcribed using the Dragon dictation system.      XR Chest 1 View  Result Date: 7/1/2025  Impression: No active cardiopulmonary disease Electronically Signed: Bryon Escamilla  7/1/2025 4:36 PM EDT  Workstation ID: [id number]    MRI Brain Without Contrast  Result Date: 7/1/2025  Impression: 1.No acute intracranial process identified. 2.Mucosal disease within right maxillary sinus. Electronically Signed: Sanjay Childs MD  7/1/2025 2:21 PM EDT  Workstation ID: [id number]    CT Angiogram Head w AI Analysis of LVO  Result Date: 7/1/2025  No intracranial large vessel occlusion is identified. Electronically Signed: Yair Aldana MD  7/1/2025 1:58 PM EDT  Workstation ID: [id number]    CT Angiogram Neck  Result Date: 7/1/2025  Impression: CT angiogram of the neck demonstrates no evidence of high-grade stenosis, large vessel occlusion or aneurysm. Small focal linear luminal abnormality seen along the posterior wall of the left ICA origin, favoring a carotid web, with short segment dissection an additional consideration. There is no evidence of associated thrombus formation or luminal narrowing. Electronically Signed: Dajuan Robles MD  7/1/2025 1:37 PM EDT  Workstation ID: [id number]    CT CEREBRAL PERFUSION WITH & WITHOUT CONTRAST  Result Date: 7/1/2025  Impression:  1.Right greater than left anterior circulation diminished CBF suggestive of infarct. 2.There is a large area of right greater than left elevated Tmax (volume 81 mL) with mismatch volume 40 mL. 3.When compared to same day CTA, this does not appear to correlate to findings on preliminary review of the CTA and these findings may be secondary to suboptimal bolus timing. Findings discussed with stroke team by Dr. Kenneth Anne via telephone on 7/1/2025 1:32 PM EDT. Electronically Signed: Kenneth Anne MD  7/1/2025 1:32 PM EDT  Workstation ID: [id number]    CT Head Without Contrast Stroke Protocol  Result Date: 7/1/2025  Impression: No evidence of acute intracranial hemorrhage or large territory infarct. Electronically Signed: Kenneth Anne MD  7/1/2025 1:10 PM EDT  Workstation ID: [id number]              Assessment/Plan     Assessment/Plan:  40-year-old female with past medical history of tobacco abuse, HTN, syncope, vertigo presented to [facility] emergency department for further evaluation of left-sided numbness, tingling, weakness and soreness that started on Sunday.    CTA vessels open, no significant athero   MRI brain - no acute infarct     Left side paresthesias.  Secondary to elevated blood pressure    Unlikely this to be an ischemic event of the brain/TIA.      Plan  -Normal blood pressure goals, recommend treatment to normalize her blood pressure  -Okay for aspirin 81 daily for stroke primary prevention    No further workup from neurology standpoint.            Brown Conner MD  07/02/25  10:42 EDT

## 2025-07-02 NOTE — CONSULTS
Diabetes Education    Patient Name:  Carina MCCOLLUM  YOB: 1985  MRN: [id number]  Admit Date:  7/1/2025    Chart review for diabetes educator consult.     At the time of this review patient A1c is 5.47%, they have no noted history of diabetes and no home medications noted for treatment of diabetes. At this time we do not feel the patient would benefit from diabetes education. Thank you for this consult, should patient needs change please re consult us.    Electronically signed by:  Marilyn Paz RN  07/02/25 08:46 EDT

## 2025-07-03 ENCOUNTER — TRANSITIONAL CARE MANAGEMENT TELEPHONE ENCOUNTER (OUTPATIENT)
Dept: CALL CENTER | Facility: HOSPITAL | Age: 40
End: 2025-07-03
Payer: COMMERCIAL

## 2025-07-03 NOTE — OUTREACH NOTE
Call Center TCM Note      Flowsheet Row Responses   [facility] patient discharged from? [place]   Does the patient have one of the following disease processes/diagnoses(primary or secondary)? Other   TCM attempt successful? Yes   Call start time 1518   Call end time 1521   Discharge diagnosis Left sided weakness   Meds reviewed with patient/caregiver? Yes   Is the patient having any side effects they believe may be caused by any medication additions or changes? No   Does the patient have all medications ordered at discharge? Yes   Is the patient taking all medications as directed (includes completed medication regime)? Yes   Does the patient have an appointment with their PCP within 7-14 days of discharge? Yes   Psychosocial issues? No   Did the patient receive a copy of their discharge instructions? Yes   Nursing interventions Reviewed instructions with patient   What is the patient's perception of their health status since discharge? Improving   Is the patient/caregiver able to teach back signs and symptoms related to disease process for when to call PCP? Yes   Is the patient/caregiver able to teach back signs and symptoms related to disease process for when to call 911? Yes   Is the patient/caregiver able to teach back the hierarchy of who to call/visit for symptoms/problems? PCP, Specialist, Home health nurse, Urgent Care, ED, 911 Yes   TCM call completed? Yes   Wrap up additional comments Pt denies any left sided numbness. Reviewed AVS/meds with pt. Pt verified PCp fu appt   Call end time 1521   Would this patient benefit from a Referral to Amb Social Work? No   Is the patient interested in additional calls from an ambulatory ? No            Traci Henao Registered Nurse    7/3/2025, 15:21 EDT

## 2025-07-03 NOTE — OUTREACH NOTE
Call Center TCM Note      Flowsheet Row Responses   [facility] patient discharged from? [place]   Does the patient have one of the following disease processes/diagnoses(primary or secondary)? Other   TCM attempt successful? No   Unsuccessful attempts Attempt 1   Call Status Left message            Traci Henao Registered Nurse    7/3/2025, 13:47 EDT

## 2025-07-11 ENCOUNTER — OFFICE VISIT (OUTPATIENT)
Dept: FAMILY MEDICINE CLINIC | Facility: CLINIC | Age: 40
End: 2025-07-11
Payer: COMMERCIAL

## 2025-07-11 VITALS
HEART RATE: 81 BPM | DIASTOLIC BLOOD PRESSURE: 80 MMHG | WEIGHT: 126 LBS | HEIGHT: 65 IN | SYSTOLIC BLOOD PRESSURE: 120 MMHG | BODY MASS INDEX: 20.99 KG/M2 | OXYGEN SATURATION: 96 %

## 2025-07-11 DIAGNOSIS — G89.29 CHRONIC NONINTRACTABLE HEADACHE, UNSPECIFIED HEADACHE TYPE: ICD-10-CM

## 2025-07-11 DIAGNOSIS — I10 HYPERTENSION, ESSENTIAL: ICD-10-CM

## 2025-07-11 DIAGNOSIS — R53.1 LEFT-SIDED WEAKNESS: Primary | ICD-10-CM

## 2025-07-11 DIAGNOSIS — R55 SYNCOPE, NEAR: ICD-10-CM

## 2025-07-11 DIAGNOSIS — R51.9 CHRONIC NONINTRACTABLE HEADACHE, UNSPECIFIED HEADACHE TYPE: ICD-10-CM

## 2025-07-11 DIAGNOSIS — I35.1 AORTIC VALVE INSUFFICIENCY, ETIOLOGY OF CARDIAC VALVE DISEASE UNSPECIFIED: ICD-10-CM

## 2025-07-11 NOTE — PROGRESS NOTES
"Chief Complaint  Transitional Care Management (Admitted to  )    Subjective          Carina MCCOLLUM presents to [facility]  History of Present Illness    Objective   Vital Signs:   /80   Pulse 81   Ht 165.1 cm (65\")   Wt 57.2 kg (126 lb)   SpO2 96%   BMI 20.97 kg/m²     Body mass index is 20.97 kg/m².    Review of Systems    Past History:  Medical History: has no past medical history on file.   Surgical History: has a past surgical history that includes Essure tubal ligation (Bilateral, 2012).   Family History: family history includes Breast cancer in her mother; COPD in her mother; Lung cancer in her maternal aunt; No Known Problems in her brother and father.   Social History: reports that she has been smoking cigarettes. She started smoking about 25 years ago. She has a 12.8 pack-year smoking history. She does not have any smokeless tobacco history on file. She reports current alcohol use. She reports that she does not use drugs.      Current Outpatient Medications:   •  aspirin 81 MG chewable tablet, Chew 1 tablet Daily., Disp: 30 tablet, Rfl: 0  •  atorvastatin (LIPITOR) 40 MG tablet, Take 1 tablet by mouth Every Night., Disp: 90 tablet, Rfl: 0  •  lisinopril (PRINIVIL,ZESTRIL) 20 MG tablet, Take 1 tablet by mouth Daily., Disp: 30 tablet, Rfl: 0  Allergies: Patient has no known allergies.    Physical Exam        Assessment and Plan   There are no diagnoses linked to this encounter.        Follow Up   No follow-ups on file.  Patient was given instructions and counseling regarding her condition or for health maintenance advice. Please see specific information pulled into the AVS if appropriate.     Ambar Ornelas PA-C  "

## 2025-07-12 RX ORDER — LISINOPRIL 20 MG/1
20 TABLET ORAL
Qty: 90 TABLET | Refills: 1 | Status: SHIPPED | OUTPATIENT
Start: 2025-07-12

## 2025-07-12 NOTE — PROGRESS NOTES
Transitional Care Follow Up Visit  Subjective     Carina MCCOLLUM is a 40 y.o. female who presents for a transitional care management visit.    Within 48 business hours after discharge our office contacted her via telephone to coordinate her care and needs.      I reviewed and discussed the details of that call along with the discharge summary, hospital problems, inpatient lab results, inpatient diagnostic studies, and consultation reports with Carina.     Current outpatient and discharge medications have been reconciled for the patient.  Reviewed by: Ambar Ornelas PA-C          7/2/2025     4:57 PM   Date of TCM Phone Call   [facility]   Date of Admission 7/1/2025   Date of Discharge 7/2/2025   Discharge Disposition Home or Self Care     Risk for Readmission (LACE) Score: 1 (7/2/2025  6:00 AM)      History of Present Illness   Course During Hospital Stay:  Patient was admitted on 7/1/2025 and discharged on 7/2/2025.  She was seen for left sided weakness and hypertension. Her MRI was negative for acute process. 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 '    She states her left sided weakness has not returned since hospital discharge Blood pressure has been doing well. Denies chest pain or shortness of breath. She has history of migraine headaches and states has had several near syncopal episodes since 4/2025.     She states is not interested in smoking cessation at this time.     Echo showed mild aortic valve regurgitation.      The following portions of the patient's history were reviewed and updated as appropriate: allergies, current medications, past family history, past medical history, past social history, past surgical history, and problem list.    Review of Systems   Constitutional:  Negative for fatigue and fever.   HENT:  Negative for congestion and sore throat.    Respiratory:   "Negative for cough and shortness of breath.    Cardiovascular:  Negative for chest pain and palpitations.   Gastrointestinal:  Negative for abdominal pain, diarrhea, nausea and vomiting.   Neurological:  Positive for headaches. Negative for numbness.   Psychiatric/Behavioral:  Negative for dysphoric mood. The patient is not nervous/anxious.        Objective   /80   Pulse 81   Ht 165.1 cm (65\")   Wt 57.2 kg (126 lb)   SpO2 96%   BMI 20.97 kg/m²   Physical Exam  Constitutional:       Appearance: Normal appearance.   HENT:      Right Ear: Tympanic membrane normal.      Left Ear: Tympanic membrane normal.      Mouth/Throat:      Pharynx: Oropharynx is clear.   Eyes:      Conjunctiva/sclera: Conjunctivae normal.      Pupils: Pupils are equal, round, and reactive to light.   Cardiovascular:      Rate and Rhythm: Normal rate and regular rhythm.      Heart sounds: Normal heart sounds.   Pulmonary:      Effort: Pulmonary effort is normal.      Breath sounds: Normal breath sounds.   Abdominal:      Palpations: Abdomen is soft.      Tenderness: There is no abdominal tenderness.   Neurological:      Mental Status: She is oriented to person, place, and time.      Cranial Nerves: Cranial nerves 2-12 are intact.      Coordination: Coordination is intact.      Gait: Gait is intact.      Comments: Good upper and lower musculoskeletal strength; walks with normal gait    Psychiatric:         Mood and Affect: Mood normal.         Behavior: Behavior normal.         Assessment & Plan   Diagnoses and all orders for this visit:    1. Left-sided weakness (Primary)  -     Ambulatory Referral to Neurology  Will set up with neurology for further evaluation on headaches , history of left sided weakness and near syncopal episodes; for any return of acute symptoms to ER prior if needed   2. Chronic nonintractable headache, unspecified headache type  -     Ambulatory Referral to Neurology    3. Syncope, near  -     Ambulatory Referral " to Neurology  -     Ambulatory Referral to Cardiology for Other; mild aortic valve regurgitation; near syncope    4. Aortic valve insufficiency, etiology of cardiac valve disease unspecified  -     Ambulatory Referral to Cardiology for Other; mild aortic valve regurgitation; near syncope  Will put in referral for cardiology consult ; encourage smoking cessation- was given nicotine patches from hospital.     5. Hypertension, essential  Blood pressure doing well- continue on current dosage of lisinopril and monitoring of blood pressure ;rtc prior to recheck as needed

## 2025-08-05 ENCOUNTER — OFFICE VISIT (OUTPATIENT)
Dept: FAMILY MEDICINE CLINIC | Facility: CLINIC | Age: 40
End: 2025-08-05
Payer: COMMERCIAL

## 2025-08-05 VITALS
HEIGHT: 65 IN | OXYGEN SATURATION: 98 % | WEIGHT: 124 LBS | BODY MASS INDEX: 20.66 KG/M2 | SYSTOLIC BLOOD PRESSURE: 110 MMHG | DIASTOLIC BLOOD PRESSURE: 80 MMHG | HEART RATE: 61 BPM

## 2025-08-05 DIAGNOSIS — H10.9 CONJUNCTIVITIS OF RIGHT EYE, UNSPECIFIED CONJUNCTIVITIS TYPE: Primary | ICD-10-CM

## 2025-08-05 PROCEDURE — 99213 OFFICE O/P EST LOW 20 MIN: CPT | Performed by: PHYSICIAN ASSISTANT

## 2025-08-05 RX ORDER — MOXIFLOXACIN 5 MG/ML
SOLUTION/ DROPS OPHTHALMIC
Qty: 3 ML | Refills: 0 | Status: SHIPPED | OUTPATIENT
Start: 2025-08-05

## 2025-08-25 ENCOUNTER — TELEPHONE (OUTPATIENT)
Dept: FAMILY MEDICINE CLINIC | Facility: CLINIC | Age: 40
End: 2025-08-25
Payer: COMMERCIAL